# Patient Record
Sex: MALE | Race: WHITE | NOT HISPANIC OR LATINO
[De-identification: names, ages, dates, MRNs, and addresses within clinical notes are randomized per-mention and may not be internally consistent; named-entity substitution may affect disease eponyms.]

---

## 2018-08-07 ENCOUNTER — APPOINTMENT (OUTPATIENT)
Dept: UROLOGY | Facility: CLINIC | Age: 68
End: 2018-08-07
Payer: MEDICARE

## 2018-08-07 VITALS
SYSTOLIC BLOOD PRESSURE: 134 MMHG | HEART RATE: 63 BPM | RESPIRATION RATE: 16 BRPM | DIASTOLIC BLOOD PRESSURE: 72 MMHG | TEMPERATURE: 98.3 F

## 2018-08-07 PROCEDURE — 99204 OFFICE O/P NEW MOD 45 MIN: CPT

## 2018-08-07 RX ORDER — MULTIVITAMIN
TABLET ORAL
Refills: 0 | Status: ACTIVE | COMMUNITY
Start: 2018-08-07

## 2018-08-07 RX ORDER — OMEGA-3/DHA/EPA/FISH OIL 1200 MG
1200 CAPSULE ORAL
Refills: 0 | Status: ACTIVE | COMMUNITY
Start: 2018-08-07

## 2018-08-13 LAB
APPEARANCE: CLEAR
BACTERIA UR CULT: NORMAL
BACTERIA: NEGATIVE
BILIRUBIN URINE: NEGATIVE
BLOOD URINE: NEGATIVE
COLOR: YELLOW
CORE LAB FLUID CYTOLOGY: NORMAL
GLUCOSE QUALITATIVE U: NEGATIVE MG/DL
HYALINE CASTS: 0 /LPF
KETONES URINE: NEGATIVE
LEUKOCYTE ESTERASE URINE: NEGATIVE
MICROSCOPIC-UA: NORMAL
NITRITE URINE: NEGATIVE
PH URINE: 6.5
PROTEIN URINE: NEGATIVE MG/DL
RED BLOOD CELLS URINE: 1 /HPF
SPECIFIC GRAVITY URINE: 1.02
SQUAMOUS EPITHELIAL CELLS: 0 /HPF
UROBILINOGEN URINE: NEGATIVE MG/DL
WHITE BLOOD CELLS URINE: 1 /HPF

## 2018-08-28 ENCOUNTER — APPOINTMENT (OUTPATIENT)
Dept: UROLOGY | Facility: CLINIC | Age: 68
End: 2018-08-28
Payer: MEDICARE

## 2018-08-28 DIAGNOSIS — Z12.5 ENCOUNTER FOR SCREENING FOR MALIGNANT NEOPLASM OF PROSTATE: ICD-10-CM

## 2018-08-28 PROCEDURE — 99214 OFFICE O/P EST MOD 30 MIN: CPT

## 2018-09-24 ENCOUNTER — LABORATORY RESULT (OUTPATIENT)
Age: 68
End: 2018-09-24

## 2018-09-24 ENCOUNTER — APPOINTMENT (OUTPATIENT)
Dept: UROLOGY | Facility: CLINIC | Age: 68
End: 2018-09-24
Payer: MEDICARE

## 2018-09-24 LAB
CORTIS SERPL-MCNC: 6.7 UG/DL
T3RU NFR SERPL: 1.14 INDEX
T4 SERPL-MCNC: 5.8 UG/DL
TSH SERPL-ACNC: 3.35 UIU/ML

## 2018-09-24 PROCEDURE — 99214 OFFICE O/P EST MOD 30 MIN: CPT

## 2018-09-25 LAB
ACTH SER-ACNC: 44 PG/ML
ALDOSTERONE SERUM: 7.8 NG/DL

## 2018-10-01 LAB
TESTOST BND SERPL-MCNC: 10 PG/ML
TESTOST SERPL-MCNC: 600.9 NG/DL

## 2018-10-16 ENCOUNTER — APPOINTMENT (OUTPATIENT)
Dept: UROLOGY | Facility: CLINIC | Age: 68
End: 2018-10-16
Payer: MEDICARE

## 2018-10-16 DIAGNOSIS — R31.0 GROSS HEMATURIA: ICD-10-CM

## 2018-10-16 LAB
APPEARANCE: CLEAR
BACTERIA: NEGATIVE
BILIRUBIN URINE: NEGATIVE
BLOOD URINE: NEGATIVE
COLOR: YELLOW
GLUCOSE QUALITATIVE U: NEGATIVE MG/DL
KETONES URINE: NEGATIVE
LEUKOCYTE ESTERASE URINE: NEGATIVE
MICROSCOPIC-UA: NORMAL
NITRITE URINE: NEGATIVE
PH URINE: 6.5
PROTEIN URINE: NEGATIVE MG/DL
RED BLOOD CELLS URINE: 1 /HPF
SPECIFIC GRAVITY URINE: 1.01
SQUAMOUS EPITHELIAL CELLS: 0 /HPF
UROBILINOGEN URINE: NEGATIVE MG/DL
WHITE BLOOD CELLS URINE: 1 /HPF

## 2018-10-16 PROCEDURE — 99214 OFFICE O/P EST MOD 30 MIN: CPT

## 2018-10-16 RX ORDER — ALFUZOSIN HYDROCHLORIDE 10 MG/1
10 TABLET, EXTENDED RELEASE ORAL
Qty: 30 | Refills: 11 | Status: COMPLETED | COMMUNITY
Start: 2018-08-28 | End: 2018-10-16

## 2018-10-20 LAB
BACTERIA UR CULT: NORMAL
CORE LAB FLUID CYTOLOGY: NORMAL

## 2018-11-30 ENCOUNTER — MEDICATION RENEWAL (OUTPATIENT)
Age: 68
End: 2018-11-30

## 2019-06-18 ENCOUNTER — APPOINTMENT (OUTPATIENT)
Dept: UROLOGY | Facility: CLINIC | Age: 69
End: 2019-06-18
Payer: MEDICARE

## 2019-06-18 DIAGNOSIS — Z00.00 ENCOUNTER FOR GENERAL ADULT MEDICAL EXAMINATION W/OUT ABNORMAL FINDINGS: ICD-10-CM

## 2019-06-18 PROCEDURE — 99214 OFFICE O/P EST MOD 30 MIN: CPT

## 2019-06-18 NOTE — REVIEW OF SYSTEMS
[Eyesight Problems] : eyesight problems [Loss Of Hearing] : hearing loss [Nocturia] : nocturia [Arthralgias] : arthralgias [Joint Pain] : joint pain [Sleep Disturbances] : sleep disturbances [Anxiety] : anxiety [Erectile Dysfunction] : erectile dysfunction [Easy Bleeding] : a tendency for easy bleeding [Easy Bruising] : a tendency for easy bruising [Feeling Poorly] : not feeling poorly [Feeling Tired] : not feeling tired [Nosebleeds] : no nosebleeds [Cough] : no cough [Chest Pain] : no chest pain [Dysuria] : no dysuria [Incontinence] : no incontinence [Constipation] : no constipation [Hesitancy] : no urinary hesitancy [Skin Lesions] : no skin lesions [Testicular Pain] : no testicular pain [Convulsions] : no convulsions [Skin Wound] : no skin wound

## 2019-06-18 NOTE — ADDENDUM
[FreeTextEntry1] : This note was authored by Cristian Eddy working as scribe for Dr. Luis Lopez. I, Dr. Luis Lopez, have reviewed the content of this note and confirm it is true and accurate. I personally performed the history and physical examination and made all the decisions.\par 6/18/19

## 2019-06-18 NOTE — PHYSICAL EXAM
[General Appearance - Well Developed] : well developed [General Appearance - Well Nourished] : well nourished [Normal Appearance] : normal appearance [Well Groomed] : well groomed [General Appearance - In No Acute Distress] : no acute distress [Abdomen Soft] : soft [Abdomen Tenderness] : non-tender [Costovertebral Angle Tenderness] : no ~M costovertebral angle tenderness [Skin Color & Pigmentation] : normal skin color and pigmentation [Heart Rate And Rhythm] : Heart rate and rhythm were normal [Edema] : no peripheral edema [] : no respiratory distress [Respiration, Rhythm And Depth] : normal respiratory rhythm and effort [Exaggerated Use Of Accessory Muscles For Inspiration] : no accessory muscle use [Oriented To Time, Place, And Person] : oriented to person, place, and time [Affect] : the affect was normal [Mood] : the mood was normal [Not Anxious] : not anxious [Normal Station and Gait] : the gait and station were normal for the patient's age [No Focal Deficits] : no focal deficits [No Palpable Adenopathy] : no palpable adenopathy [Cervical Lymph Nodes Enlarged Posterior Bilaterally] : posterior cervical [Cervical Lymph Nodes Enlarged Anterior Bilaterally] : anterior cervical [Supraclavicular Lymph Nodes Enlarged Bilaterally] : supraclavicular [FreeTextEntry1] : Smile Symmetric. Tongue is Midline. hand  is 5/5 bilaterally \par  hand  5/5 bilaterally\par

## 2019-06-18 NOTE — ASSESSMENT
[FreeTextEntry1] : Mr. Denis is  a 69 year old male presented with urinary urgency, erectile dysfunction, and history of kidney stones. Patient was seen by me 4 years ago. Noted he had acute flank pain that resolved on its own a few weeks ago. Complained that when he drinks fluids he will have urinary urgency but denied urge incontinence. Patient denies dysuria, gross hematuria, or incontinence.  I advised the patient to increase his water intake for his kidney stones. Complained of erectile dysfunction, erections are not adequate for penetration. Retired. Former smoker, 2 packs daily. \par 8/28/18: The patient returned and reported his urination has improved since taking tamsulosin. Noted he has had been having abdominal pain since starting tamsulosin. We will switch over to Alfuzosin. Denied any nocturia. PSA from 8/7/18 was 2.39 ng/mL.\par 9/24/18: The patient returned and reported Alfuzosin did not improve his urination. Noted he wanted to try tamsulosin again. Discontinued use before due to upset stomach but noted upset stomach may have been from changing his diet and not the tamsulosin. Complained of decreased sexual desire. Testosterone from 8/7/18 was 362.4 ng/dL. 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 We will check hormone levels today. \par 10/16/18: The patient returned today and reported urination has improved since taking Tamsulosin and denies any side effects. Testosterone, adrenal hormones, THS is normal. The patient denies urinary urgency. Wakes up 0-1 time during the night to urinate. \par \par 6/18/19: Patient presents today for a follow up. Today he reports that his urination is excellent. Tamsulosin 0.4 mg is taken as directed. He is able to take 2 hour drives without having to worry. He was also able to consume a few glasses of beer at a ball game without having to urinate. He is not able to achieve erections and is considering beginning medication at this time.  \par \par Digital rectal exam found no suspicious rectal masses. Anal tone is normal. The prostate is non tender with normal texture, discrete borders and no nodules. It is a 45 gram transurethral resection size. \par \par The patient produced a urine sample which will be sent for urinalysis, urine cytology, and urine culture.\par Blood work today included comprehensive metabolic panel, CBC, PSA and testosterone. \par \par Sildenafil 100 mg is to be initiated at this time. \par \par The patient will return in 6 months.

## 2019-06-24 LAB
ALBUMIN SERPL ELPH-MCNC: 5.1 G/DL
ALP BLD-CCNC: 59 U/L
ALT SERPL-CCNC: 36 U/L
ANION GAP SERPL CALC-SCNC: 12 MMOL/L
APPEARANCE: CLEAR
AST SERPL-CCNC: 31 U/L
BACTERIA UR CULT: NORMAL
BACTERIA: NEGATIVE
BASOPHILS # BLD AUTO: 0.02 K/UL
BASOPHILS NFR BLD AUTO: 0.3 %
BILIRUB SERPL-MCNC: 0.6 MG/DL
BILIRUBIN URINE: NEGATIVE
BLOOD URINE: NEGATIVE
BUN SERPL-MCNC: 25 MG/DL
CALCIUM SERPL-MCNC: 9.9 MG/DL
CHLORIDE SERPL-SCNC: 103 MMOL/L
CO2 SERPL-SCNC: 26 MMOL/L
COLOR: YELLOW
CREAT SERPL-MCNC: 0.87 MG/DL
EOSINOPHIL # BLD AUTO: 0.08 K/UL
EOSINOPHIL NFR BLD AUTO: 1.1 %
GLUCOSE QUALITATIVE U: NORMAL
GLUCOSE SERPL-MCNC: 96 MG/DL
HCT VFR BLD CALC: 46.6 %
HGB BLD-MCNC: 15.1 G/DL
HYALINE CASTS: 0 /LPF
IMM GRANULOCYTES NFR BLD AUTO: 0.4 %
KETONES URINE: NEGATIVE
LEUKOCYTE ESTERASE URINE: NEGATIVE
LYMPHOCYTES # BLD AUTO: 1.55 K/UL
LYMPHOCYTES NFR BLD AUTO: 21.7 %
MAN DIFF?: NORMAL
MCHC RBC-ENTMCNC: 30.6 PG
MCHC RBC-ENTMCNC: 32.4 GM/DL
MCV RBC AUTO: 94.5 FL
MICROSCOPIC-UA: NORMAL
MONOCYTES # BLD AUTO: 0.66 K/UL
MONOCYTES NFR BLD AUTO: 9.2 %
NEUTROPHILS # BLD AUTO: 4.81 K/UL
NEUTROPHILS NFR BLD AUTO: 67.3 %
NITRITE URINE: NEGATIVE
PH URINE: 6
PLATELET # BLD AUTO: 242 K/UL
POTASSIUM SERPL-SCNC: 4.4 MMOL/L
PROT SERPL-MCNC: 7.6 G/DL
PROTEIN URINE: NORMAL
PSA FREE FLD-MCNC: 28 %
PSA FREE SERPL-MCNC: 0.71 NG/ML
PSA SERPL-MCNC: 2.59 NG/ML
RBC # BLD: 4.93 M/UL
RBC # FLD: 13.2 %
RED BLOOD CELLS URINE: 1 /HPF
SODIUM SERPL-SCNC: 141 MMOL/L
SPECIFIC GRAVITY URINE: 1.03
SQUAMOUS EPITHELIAL CELLS: 0 /HPF
TESTOST SERPL-MCNC: 385 NG/DL
URINE CYTOLOGY: NORMAL
UROBILINOGEN URINE: NORMAL
WBC # FLD AUTO: 7.15 K/UL
WHITE BLOOD CELLS URINE: 0 /HPF

## 2020-01-09 ENCOUNTER — APPOINTMENT (OUTPATIENT)
Dept: UROLOGY | Facility: CLINIC | Age: 70
End: 2020-01-09
Payer: MEDICARE

## 2020-01-09 PROCEDURE — 99214 OFFICE O/P EST MOD 30 MIN: CPT

## 2020-01-09 NOTE — ASSESSMENT
[FreeTextEntry1] : Mr. Denis is  a 69 year old male presented with urinary urgency, erectile dysfunction, and history of kidney stones. Patient was seen by me 4 years ago. Noted he had acute flank pain that resolved on its own a few weeks ago. Complained that when he drinks fluids he will have urinary urgency but denied urge incontinence. Patient denies dysuria, gross hematuria, or incontinence.  I advised the patient to increase his water intake for his kidney stones. Complained of erectile dysfunction, erections are not adequate for penetration. Retired. Former smoker, 2 packs daily. \par 8/28/18: The patient returned and reported his urination has improved since taking tamsulosin. Noted he has had been having abdominal pain since starting tamsulosin. We will switch over to Alfuzosin. Denied any nocturia. PSA from 8/7/18 was 2.39 ng/mL.\par 9/24/18: The patient returned and reported Alfuzosin did not improve his urination. Noted he wanted to try tamsulosin again. Discontinued use before due to upset stomach but noted upset stomach may have been from changing his diet and not the tamsulosin. Complained of decreased sexual desire. Testosterone from 8/7/18 was 362.4 ng/dL. 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 We will check hormone levels today. \par 10/16/18: The patient returned today and reported urination has improved since taking Tamsulosin and denies any side effects. Testosterone, adrenal hormones, THS is normal. The patient denies urinary urgency. Wakes up 0-1 time during the night to urinate. \par \par 6/18/19: Patient presents today for a follow up. Today he reports that his urination is excellent. Tamsulosin 0.4 mg is taken as directed. He is able to take 2 hour drives without having to worry. He was also able to consume a few glasses of beer at a ball game without having to urinate. He is not able to achieve erections and is considering beginning medication at this time.  \par \par 01/09/2020: Patient presents today for a follow up of BPH.  Patient reports he notices his urination is great on flomax. He has good control over his urination. He reports his urgency can be improved. Denies dysuria, hematuria, frequency, incontinence, flank pain, fever, or chills.\par PSA 2.59 from 6/18/2019\par \par BPH: pt will start 2 capsule flomax daily for LUTS. Advised to cut down on caffeinated drinks. \par \par The patient will return in 6 months for PSA and prostate exam.

## 2020-01-09 NOTE — REVIEW OF SYSTEMS
[Loss Of Hearing] : hearing loss [Eyesight Problems] : eyesight problems [Nocturia] : nocturia [Joint Pain] : joint pain [Arthralgias] : arthralgias [Anxiety] : anxiety [Sleep Disturbances] : sleep disturbances [Erectile Dysfunction] : erectile dysfunction [Negative] : Neurological [Feeling Tired] : not feeling tired [Feeling Poorly] : not feeling poorly [Nosebleeds] : no nosebleeds [Chest Pain] : no chest pain [Cough] : no cough [Dysuria] : no dysuria [Constipation] : no constipation [Incontinence] : no incontinence [Testicular Pain] : no testicular pain [Hesitancy] : no urinary hesitancy [Skin Lesions] : no skin lesions [Skin Wound] : no skin wound [Convulsions] : no convulsions

## 2020-01-09 NOTE — ADDENDUM
[FreeTextEntry1] : This note was authored by Joselyn Fuentes, working as scribe for Dr. Luis Lopez.\par \par I, Dr. Luis Lopez, have have reviewed the content of this note and confirm it is true and accurate. I personally performed the history and physical examination and made all the decisions. \par \par 01/09/2020

## 2020-01-09 NOTE — PHYSICAL EXAM
[General Appearance - Well Developed] : well developed [Normal Appearance] : normal appearance [General Appearance - Well Nourished] : well nourished [General Appearance - In No Acute Distress] : no acute distress [Well Groomed] : well groomed [Costovertebral Angle Tenderness] : no ~M costovertebral angle tenderness [Abdomen Soft] : soft [Abdomen Tenderness] : non-tender [Skin Color & Pigmentation] : normal skin color and pigmentation [Heart Rate And Rhythm] : Heart rate and rhythm were normal [Edema] : no peripheral edema [] : no respiratory distress [Respiration, Rhythm And Depth] : normal respiratory rhythm and effort [Exaggerated Use Of Accessory Muscles For Inspiration] : no accessory muscle use [Oriented To Time, Place, And Person] : oriented to person, place, and time [Affect] : the affect was normal [Mood] : the mood was normal [Not Anxious] : not anxious [Normal Station and Gait] : the gait and station were normal for the patient's age [No Focal Deficits] : no focal deficits [No Palpable Adenopathy] : no palpable adenopathy [Cervical Lymph Nodes Enlarged Posterior Bilaterally] : posterior cervical [Cervical Lymph Nodes Enlarged Anterior Bilaterally] : anterior cervical [Supraclavicular Lymph Nodes Enlarged Bilaterally] : supraclavicular [FreeTextEntry1] : no spine tenderness

## 2020-01-09 NOTE — HISTORY OF PRESENT ILLNESS
[FreeTextEntry1] : Mr. Denis is  a 69 year old male presented with urinary urgency, erectile dysfunction, and history of kidney stones. Patient was seen by me 4 years ago. Noted he had acute flank pain that resolved on its own a few weeks ago. Complained that when he drinks fluids he will have urinary urgency but denied urge incontinence. Patient denies dysuria, gross hematuria, or incontinence.  I advised the patient to increase his water intake for his kidney stones. Complained of erectile dysfunction, erections are not adequate for penetration. Retired. Former smoker, 2 packs daily. \par 8/28/18: The patient returned and reported his urination has improved since taking tamsulosin. Noted he has had been having abdominal pain since starting tamsulosin. We will switch over to Alfuzosin. Denied any nocturia. PSA from 8/7/18 was 2.39 ng/mL.\par 9/24/18: The patient returned and reported Alfuzosin did not improve his urination. Noted he wanted to try tamsulosin again. Discontinued use before due to upset stomach but noted upset stomach may have been from changing his diet and not the tamsulosin. Complained of decreased sexual desire. Testosterone from 8/7/18 was 362.4 ng/dL. 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 We will check hormone levels today. \par 10/16/18: The patient returned today and reported urination has improved since taking Tamsulosin and denies any side effects. Testosterone, adrenal hormones, THS is normal. The patient denies urinary urgency. Wakes up 0-1 time during the night to urinate. \par 6/18/19: Patient presents today for a follow up. Today he reports that his urination is excellent. Tamsulosin 0.4 mg is taken as directed. He is able to take 2 hour drives without having to worry. He was also able to consume a few glasses of beer at a ball game without having to urinate. He is not able to achieve erections and is considering beginning medication at this time.  \par \par 01/09/2020: Patient presents today for a follow up of BPH.  Patient reports he notices his urination is great on flomax. He has good control over his urination. He reports his urgency can be improved. Denies dysuria, hematuria, frequency, incontinence, flank pain, fever, or chills.\par PSA 2.59 from 6/18/2019

## 2020-01-12 LAB
APPEARANCE: CLEAR
BACTERIA UR CULT: NORMAL
BACTERIA: NEGATIVE
BILIRUBIN URINE: NEGATIVE
BLOOD URINE: NEGATIVE
COLOR: COLORLESS
GLUCOSE QUALITATIVE U: NEGATIVE
HYALINE CASTS: 0 /LPF
KETONES URINE: NEGATIVE
LEUKOCYTE ESTERASE URINE: NEGATIVE
MICROSCOPIC-UA: NORMAL
NITRITE URINE: NEGATIVE
PH URINE: 6
PROTEIN URINE: NEGATIVE
RED BLOOD CELLS URINE: 0 /HPF
SPECIFIC GRAVITY URINE: 1.01
SQUAMOUS EPITHELIAL CELLS: 0 /HPF
UROBILINOGEN URINE: NORMAL
WHITE BLOOD CELLS URINE: 0 /HPF

## 2020-01-15 LAB — URINE CYTOLOGY: NORMAL

## 2020-06-25 ENCOUNTER — APPOINTMENT (OUTPATIENT)
Dept: UROLOGY | Facility: CLINIC | Age: 70
End: 2020-06-25

## 2021-10-28 ENCOUNTER — APPOINTMENT (OUTPATIENT)
Dept: UROLOGY | Facility: CLINIC | Age: 71
End: 2021-10-28
Payer: MEDICARE

## 2021-10-28 PROCEDURE — 99214 OFFICE O/P EST MOD 30 MIN: CPT | Mod: 25

## 2021-10-28 NOTE — REVIEW OF SYSTEMS
[Eyesight Problems] : eyesight problems [Loss Of Hearing] : hearing loss [Nocturia] : nocturia [Arthralgias] : arthralgias [Joint Pain] : joint pain [Sleep Disturbances] : sleep disturbances [Anxiety] : anxiety [Erectile Dysfunction] : erectile dysfunction [Negative] : Neurological [Feeling Poorly] : not feeling poorly [Feeling Tired] : not feeling tired [Nosebleeds] : no nosebleeds [Chest Pain] : no chest pain [Cough] : no cough [Constipation] : no constipation [Dysuria] : no dysuria [Incontinence] : no incontinence [Hesitancy] : no urinary hesitancy [Testicular Pain] : no testicular pain [Skin Lesions] : no skin lesions [Skin Wound] : no skin wound [Convulsions] : no convulsions

## 2021-10-28 NOTE — ASSESSMENT
[FreeTextEntry1] : Mr. Denis is  a 69 year old male presented with urinary urgency, erectile dysfunction, and history of kidney stones. Patient was seen by me 4 years ago. Noted he had acute flank pain that resolved on its own a few weeks ago. Complained that when he drinks fluids he will have urinary urgency but denied urge incontinence. Patient denies dysuria, gross hematuria, or incontinence.  I advised the patient to increase his water intake for his kidney stones. Complained of erectile dysfunction, erections are not adequate for penetration. Retired. Former smoker, 2 packs daily. \par 8/28/18: The patient returned and reported his urination has improved since taking tamsulosin. Noted he has had been having abdominal pain since starting tamsulosin. We will switch over to Alfuzosin. Denied any nocturia. PSA from 8/7/18 was 2.39 ng/mL.\par 9/24/18: The patient returned and reported Alfuzosin did not improve his urination. Noted he wanted to try tamsulosin again. Discontinued use before due to upset stomach but noted upset stomach may have been from changing his diet and not the tamsulosin. Complained of decreased sexual desire. Testosterone from 8/7/18 was 362.4 ng/dL. 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 We will check hormone levels today. \par 10/16/18: The patient returned today and reported urination has improved since taking Tamsulosin and denies any side effects. Testosterone, adrenal hormones, THS is normal. The patient denies urinary urgency. Wakes up 0-1 time during the night to urinate. \par \par 6/18/19: Patient presents today for a follow up. Today he reports that his urination is excellent. Tamsulosin 0.4 mg is taken as directed. He is able to take 2 hour drives without having to worry. He was also able to consume a few glasses of beer at a ball game without having to urinate. He is not able to achieve erections and is considering beginning medication at this time.  \par \par 01/09/2020: Patient presents today for a follow up of BPH.  Patient reports he notices his urination is great on flomax. He has good control over his urination. He reports his urgency can be improved. Denies dysuria, hematuria, frequency, incontinence, flank pain, fever, or chills.\par PSA 2.59 from 6/18/2019\par \par BPH: pt will start 2 capsule flomax daily for LUTS. Advised to cut down on caffeinated drinks. \par The patient will return in 6 months for PSA and prostate exam.\par \par 10/28/2021: Patient presents today for his annual urological evaluation. Urination is pretty good on tamsulosin 0.4 mg BID after a meal. He reports that he sometimes forgets to take one dose and will take one in the afternoon if he forgets the morning and then again after dinner. Denies any urge incontinence but sometimes comes close to it. Erections are not adequate but reports he hasn't had many opportunities due to his wife getting sick and needing surgery. He feels there might be a possibility for sex in the future and would like to try a medication to improve his erections. Last PSA of 6/2019 was 2.59.\par \par The knee-chest position was utilized for the NIRMAL.Digital rectal exam found no suspicious rectal masses. Anal tone is normal. The prostate is non tender, with normal texture, discrete borders, and no nodules. It is a 65 gram transurethral resection size. No gross blood on the examining finger. \par  \par Patient provided a urine specimen that will be sent for urinalysis, urine culture and urine cytology.\par  \par Blood work today included CMP, CBC with diff, alkaline phosphatase, PSA, testosterone, androstenedione, dehydroepiandrosterone sulfate serum, dehydroepiandrosterone serum, dihydrotestosterone, estradiol, estrogen total, FSH, LH, TSH, Magnesium, serum osmolality, phosphorus, progesterone, uric acid serum, and prolactin. \par \par Renewed tamsulosin 0.4 mg BID , a half an hour after a meal. \par \par I prescribed the patient tadalafil 5 mg once daily for ED as well as further improvement in his urination. Possible ill effects were reviewed, pt understood. I informed the patient that if he finds this is not enough improvement in his erections, he can take an additional 5 mg on the days he plans to have sex. \par \par Patient will RTO in one month to discuss the efficacy of tadalafil on his urination and erections. \par \par Preparation, in person office visit, and coordination of care: 30 minutes.

## 2021-10-28 NOTE — HISTORY OF PRESENT ILLNESS
[FreeTextEntry1] : Mr. Denis is  a 71 year old male presented with urinary urgency, erectile dysfunction, and history of kidney stones. Patient was seen by me 4 years ago. Noted he had acute flank pain that resolved on its own a few weeks ago. Complained that when he drinks fluids he will have urinary urgency but denied urge incontinence. Patient denies dysuria, gross hematuria, or incontinence.  I advised the patient to increase his water intake for his kidney stones. Complained of erectile dysfunction, erections are not adequate for penetration. Retired. Former smoker, 2 packs daily. \par 8/28/18: The patient returned and reported his urination has improved since taking tamsulosin. Noted he has had been having abdominal pain since starting tamsulosin. We will switch over to Alfuzosin. Denied any nocturia. PSA from 8/7/18 was 2.39 ng/mL.\par 9/24/18: The patient returned and reported Alfuzosin did not improve his urination. Noted he wanted to try tamsulosin again. Discontinued use before due to upset stomach but noted upset stomach may have been from changing his diet and not the tamsulosin. Complained of decreased sexual desire. Testosterone from 8/7/18 was 362.4 ng/dL. 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 We will check hormone levels today. \par 10/16/18: The patient returned today and reported urination has improved since taking Tamsulosin and denies any side effects. Testosterone, adrenal hormones, THS is normal. The patient denies urinary urgency. Wakes up 0-1 time during the night to urinate. \par 6/18/19: Patient presents today for a follow up. Today he reports that his urination is excellent. Tamsulosin 0.4 mg is taken as directed. He is able to take 2 hour drives without having to worry. He was also able to consume a few glasses of beer at a ball game without having to urinate. He is not able to achieve erections and is considering beginning medication at this time.  \par \par 01/09/2020: Patient presents today for a follow up of BPH.  Patient reports he notices his urination is great on flomax. He has good control over his urination. He reports his urgency can be improved. Denies dysuria, hematuria, frequency, incontinence, flank pain, fever, or chills.\par PSA 2.59 from 6/18/2019\par \par 10/28/2021: Patient presents today for his annual urological evaluation. Urination is pretty good on tamsulosin 0.4 mg BID after a meal. He reports that he sometimes forgets to take one dose and will take one in the afternoon if he forgets the morning and then again after dinner. Denies any urge incontinence but sometimes comes close to it. Erections are not adequate but reports he hasn't had many opportunities due to his wife getting sick and needing surgery. He feels there might be a possibility for sex in the future and would like to try a medication to improve his erections.  Last PSA of 6/2019 was 2.59.

## 2021-10-28 NOTE — PHYSICAL EXAM
[General Appearance - Well Developed] : well developed [General Appearance - Well Nourished] : well nourished [Normal Appearance] : normal appearance [Well Groomed] : well groomed [General Appearance - In No Acute Distress] : no acute distress [Abdomen Soft] : soft [Abdomen Tenderness] : non-tender [Costovertebral Angle Tenderness] : no ~M costovertebral angle tenderness [Edema] : no peripheral edema [] : no respiratory distress [Exaggerated Use Of Accessory Muscles For Inspiration] : no accessory muscle use [Respiration, Rhythm And Depth] : normal respiratory rhythm and effort [Oriented To Time, Place, And Person] : oriented to person, place, and time [Affect] : the affect was normal [Mood] : the mood was normal [Not Anxious] : not anxious [Normal Station and Gait] : the gait and station were normal for the patient's age [No Focal Deficits] : no focal deficits [FreeTextEntry1] : The knee-chest position was utilized for the NIRMAL.Digital rectal exam found no suspicious rectal masses. Anal tone is normal. The prostate is non tender, with normal texture, discrete borders, and no nodules. It is a 65 gram transurethral resection size. No gross blood on the examining finger.

## 2021-10-30 LAB
ALBUMIN SERPL ELPH-MCNC: 5.1 G/DL
ALP BLD-CCNC: 82 U/L
ALT SERPL-CCNC: 20 U/L
ANION GAP SERPL CALC-SCNC: 17 MMOL/L
APPEARANCE: CLEAR
AST SERPL-CCNC: 22 U/L
BACTERIA UR CULT: NORMAL
BACTERIA: NEGATIVE
BASOPHILS # BLD AUTO: 0.02 K/UL
BASOPHILS NFR BLD AUTO: 0.3 %
BILIRUB SERPL-MCNC: 0.6 MG/DL
BILIRUBIN URINE: NEGATIVE
BLOOD URINE: NEGATIVE
BUN SERPL-MCNC: 16 MG/DL
CALCIUM SERPL-MCNC: 10 MG/DL
CHLORIDE SERPL-SCNC: 102 MMOL/L
CO2 SERPL-SCNC: 23 MMOL/L
COLOR: NORMAL
CREAT SERPL-MCNC: 0.79 MG/DL
DHEA-S SERPL-MCNC: 233 UG/DL
EOSINOPHIL # BLD AUTO: 0.07 K/UL
EOSINOPHIL NFR BLD AUTO: 1.2 %
ESTRADIOL SERPL-MCNC: 18 PG/ML
FSH SERPL-MCNC: 4.4 IU/L
GLUCOSE QUALITATIVE U: NEGATIVE
GLUCOSE SERPL-MCNC: 86 MG/DL
HCT VFR BLD CALC: 47.5 %
HGB BLD-MCNC: 15.6 G/DL
HYALINE CASTS: 0 /LPF
IMM GRANULOCYTES NFR BLD AUTO: 0.3 %
KETONES URINE: NEGATIVE
LEUKOCYTE ESTERASE URINE: NEGATIVE
LH SERPL-ACNC: 3.7 IU/L
LYMPHOCYTES # BLD AUTO: 1.34 K/UL
LYMPHOCYTES NFR BLD AUTO: 23.1 %
MAGNESIUM SERPL-MCNC: 2.3 MG/DL
MAN DIFF?: NORMAL
MCHC RBC-ENTMCNC: 31.1 PG
MCHC RBC-ENTMCNC: 32.8 GM/DL
MCV RBC AUTO: 94.6 FL
MICROSCOPIC-UA: NORMAL
MONOCYTES # BLD AUTO: 0.51 K/UL
MONOCYTES NFR BLD AUTO: 8.8 %
NEUTROPHILS # BLD AUTO: 3.85 K/UL
NEUTROPHILS NFR BLD AUTO: 66.3 %
NITRITE URINE: NEGATIVE
OSMOLALITY SERPL: 304 MOSMOL/KG
PH URINE: 6.5
PHOSPHATE SERPL-MCNC: 3.5 MG/DL
PLATELET # BLD AUTO: 257 K/UL
POTASSIUM SERPL-SCNC: 4.9 MMOL/L
PROGEST SERPL-MCNC: 0.1 NG/ML
PROLACTIN SERPL-MCNC: 6.3 NG/ML
PROT SERPL-MCNC: 8.2 G/DL
PROTEIN URINE: NEGATIVE
PSA SERPL-MCNC: 2.96 NG/ML
RBC # BLD: 5.02 M/UL
RBC # FLD: 13.4 %
RED BLOOD CELLS URINE: 0 /HPF
SODIUM SERPL-SCNC: 142 MMOL/L
SPECIFIC GRAVITY URINE: 1.01
SQUAMOUS EPITHELIAL CELLS: 0 /HPF
TESTOST BND SERPL-MCNC: 11.6 PG/ML
TESTOSTERONE TOTAL S: 479 NG/DL
TSH SERPL-ACNC: 5.59 UIU/ML
URATE SERPL-MCNC: 6.5 MG/DL
URINE CYTOLOGY: NORMAL
UROBILINOGEN URINE: NORMAL
WBC # FLD AUTO: 5.81 K/UL
WHITE BLOOD CELLS URINE: 0 /HPF

## 2021-11-01 ENCOUNTER — TRANSCRIPTION ENCOUNTER (OUTPATIENT)
Age: 71
End: 2021-11-01

## 2021-11-01 ENCOUNTER — NON-APPOINTMENT (OUTPATIENT)
Age: 71
End: 2021-11-01

## 2021-11-05 LAB
ANDROST SERPL-MCNC: 59 NG/DL
DHEA SERPL-MCNC: 113 NG/DL
ESTROGEN SERPL-MCNC: 134 PG/ML
SHBG SERPL-SCNC: 56.2 NMOL/L

## 2021-11-07 LAB — ANDROSTANOLONE SERPL-MCNC: 59 NG/DL

## 2021-11-29 ENCOUNTER — APPOINTMENT (OUTPATIENT)
Dept: UROLOGY | Facility: CLINIC | Age: 71
End: 2021-11-29
Payer: MEDICARE

## 2021-11-29 PROCEDURE — 99214 OFFICE O/P EST MOD 30 MIN: CPT

## 2021-12-13 NOTE — ASSESSMENT
[FreeTextEntry1] : Mr. Denis is  a 71 year old male presented with urinary urgency, erectile dysfunction, and history of kidney stones. Patient was seen by me 4 years ago. Noted he had acute flank pain that resolved on its own a few weeks ago. Complained that when he drinks fluids he will have urinary urgency but denied urge incontinence. Patient denies dysuria, gross hematuria, or incontinence.  I advised the patient to increase his water intake for his kidney stones. Complained of erectile dysfunction, erections are not adequate for penetration. Retired. Former smoker, 2 packs daily. \par 8/28/18: The patient returned and reported his urination has improved since taking tamsulosin. Noted he has had been having abdominal pain since starting tamsulosin. We will switch over to Alfuzosin. Denied any nocturia. PSA from 8/7/18 was 2.39 ng/mL.\par 9/24/18: The patient returned and reported Alfuzosin did not improve his urination. Noted he wanted to try tamsulosin again. Discontinued use before due to upset stomach but noted upset stomach may have been from changing his diet and not the tamsulosin. Complained of decreased sexual desire. Testosterone from 8/7/18 was 362.4 ng/dL. 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 We will check hormone levels today. \par 10/16/18: The patient returned today and reported urination has improved since taking Tamsulosin and denies any side effects. Testosterone, adrenal hormones, THS is normal. The patient denies urinary urgency. Wakes up 0-1 time during the night to urinate. \par \par 6/18/19: Patient presents today for a follow up. Today he reports that his urination is excellent. Tamsulosin 0.4 mg is taken as directed. He is able to take 2 hour drives without having to worry. He was also able to consume a few glasses of beer at a ball game without having to urinate. He is not able to achieve erections and is considering beginning medication at this time.  \par \par 01/09/2020: Patient presents today for a follow up of BPH.  Patient reports he notices his urination is great on flomax. He has good control over his urination. He reports his urgency can be improved. Denies dysuria, hematuria, frequency, incontinence, flank pain, fever, or chills.\par PSA 2.59 from 6/18/2019\par \par 10/28/2021: Patient presents today for his annual urological evaluation. Urination is pretty good on tamsulosin 0.4 mg BID after a meal. He reports that he sometimes forgets to take one dose and will take one in the afternoon if he forgets the morning and then again after dinner. Denies any urge incontinence but sometimes comes close to it. Erections are not adequate but reports he hasn't had many opportunities due to his wife getting sick and needing surgery. He feels there might be a possibility for sex in the future and would like to try a medication to improve his erections. Last PSA of 6/2019 was 2.59.\par \par 11/29/2021: Patient presents today for follow up. Last visit, was started on Tadalafil 5mg once daily. Took one for three days, however developed stomach aches, headaches, blurred vision, and palpations by the second day, and stopped. States he took Tamsulosin BID once in morning and once at night however still had some urgency. However, switched to two pills at night after dinner which improved his urgency. Stlil having poor erections. \par \par Reviewed 10/28/21 lab work with patient in detail. \par \par D/c Tadalafil as patient developed side effects. \par \par Continue Tamsulosin 0.4mg two pills after dinner. \par \par Will be seeing PCP to discuss elevated TSH in January. \par \par For ED, I have discussed injections and implanted devices as alternatives to Sildenafil or Tadalafil, however patient declines.\par \par RTO in 6 months for follow up or sooner if new urinary symptoms develop. \par \par Preparation, in-person office visit, and coordination of care took:30 minutes

## 2021-12-13 NOTE — ADDENDUM
[FreeTextEntry1] : I, Dafne Mackenziein, acted solely as a scribe for Dr. Luis Lopez on this date 11/29/2021.\par \par All medical record entries made by the Scribe were at my, Dr. Luis Lopez, direction and personally dictated by me on 11/29/2021. I have reviewed the chart and agree that the record accurately reflects my personal performance of the history, physical exam, assessment and plan.  I have also personally directed, reviewed and agreed with the chart.

## 2021-12-13 NOTE — HISTORY OF PRESENT ILLNESS
[FreeTextEntry1] : Mr. Denis is  a 71 year old male presented with urinary urgency, erectile dysfunction, and history of kidney stones. Patient was seen by me 4 years ago. Noted he had acute flank pain that resolved on its own a few weeks ago. Complained that when he drinks fluids he will have urinary urgency but denied urge incontinence. Patient denies dysuria, gross hematuria, or incontinence.  I advised the patient to increase his water intake for his kidney stones. Complained of erectile dysfunction, erections are not adequate for penetration. Retired. Former smoker, 2 packs daily. \par 8/28/18: The patient returned and reported his urination has improved since taking tamsulosin. Noted he has had been having abdominal pain since starting tamsulosin. We will switch over to Alfuzosin. Denied any nocturia. PSA from 8/7/18 was 2.39 ng/mL.\par 9/24/18: The patient returned and reported Alfuzosin did not improve his urination. Noted he wanted to try tamsulosin again. Discontinued use before due to upset stomach but noted upset stomach may have been from changing his diet and not the tamsulosin. Complained of decreased sexual desire. Testosterone from 8/7/18 was 362.4 ng/dL. 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 We will check hormone levels today. \par 10/16/18: The patient returned today and reported urination has improved since taking Tamsulosin and denies any side effects. Testosterone, adrenal hormones, THS is normal. The patient denies urinary urgency. Wakes up 0-1 time during the night to urinate. \par 6/18/19: Patient presents today for a follow up. Today he reports that his urination is excellent. Tamsulosin 0.4 mg is taken as directed. He is able to take 2 hour drives without having to worry. He was also able to consume a few glasses of beer at a ball game without having to urinate. He is not able to achieve erections and is considering beginning medication at this time.  \par \par 01/09/2020: Patient presents today for a follow up of BPH.  Patient reports he notices his urination is great on flomax. He has good control over his urination. He reports his urgency can be improved. Denies dysuria, hematuria, frequency, incontinence, flank pain, fever, or chills.\par PSA 2.59 from 6/18/2019\par \par 10/28/2021: Patient presents today for his annual urological evaluation. Urination is pretty good on tamsulosin 0.4 mg BID after a meal. He reports that he sometimes forgets to take one dose and will take one in the afternoon if he forgets the morning and then again after dinner. Denies any urge incontinence but sometimes comes close to it. Erections are not adequate but reports he hasn't had many opportunities due to his wife getting sick and needing surgery. He feels there might be a possibility for sex in the future and would like to try a medication to improve his erections.  Last PSA of 6/2019 was 2.59.\par \par 11/29/2021: Patient presents today for follow up. Last visit, was started on Tadalafil 5mg once daily. Took one for three days, however developed stomach aches, headaches, blurred vision, and palpations by the second day, and stopped. States he took Tamsulosin BID once in morning and once at night however still had some urgency. However, switched to two pills at night after dinner which improved his urgency. Stlil having poor erections.

## 2022-05-31 ENCOUNTER — LABORATORY RESULT (OUTPATIENT)
Age: 72
End: 2022-05-31

## 2022-05-31 ENCOUNTER — APPOINTMENT (OUTPATIENT)
Dept: UROLOGY | Facility: CLINIC | Age: 72
End: 2022-05-31
Payer: MEDICARE

## 2022-05-31 PROCEDURE — 99214 OFFICE O/P EST MOD 30 MIN: CPT

## 2022-06-04 LAB
ALP BLD-CCNC: 70 U/L
ANION GAP SERPL CALC-SCNC: 12 MMOL/L
APPEARANCE: CLEAR
BACTERIA UR CULT: NORMAL
BACTERIA: NEGATIVE
BILIRUBIN URINE: NEGATIVE
BLOOD URINE: NEGATIVE
BUN SERPL-MCNC: 16 MG/DL
CALCIUM SERPL-MCNC: 9.7 MG/DL
CHLORIDE SERPL-SCNC: 104 MMOL/L
CO2 SERPL-SCNC: 25 MMOL/L
COLOR: YELLOW
CREAT SERPL-MCNC: 0.83 MG/DL
EGFR: 92 ML/MIN/1.73M2
ESTRADIOL SERPL-MCNC: 18 PG/ML
ESTROGEN SERPL-MCNC: 94 PG/ML
GLUCOSE QUALITATIVE U: NEGATIVE
GLUCOSE SERPL-MCNC: 91 MG/DL
HYALINE CASTS: 0 /LPF
KETONES URINE: NEGATIVE
LEUKOCYTE ESTERASE URINE: NEGATIVE
MAGNESIUM SERPL-MCNC: 2.3 MG/DL
MICROSCOPIC-UA: NORMAL
NITRITE URINE: NEGATIVE
OSMOLALITY SERPL: 516 MOSMOL/KG
PH URINE: 6.5
PHOSPHATE SERPL-MCNC: 3.2 MG/DL
POTASSIUM SERPL-SCNC: 4.5 MMOL/L
PROTEIN URINE: NEGATIVE
PSA FREE FLD-MCNC: 27 %
PSA FREE SERPL-MCNC: 0.74 NG/ML
PSA SERPL-MCNC: 2.72 NG/ML
RED BLOOD CELLS URINE: 2 /HPF
SODIUM SERPL-SCNC: 141 MMOL/L
SPECIFIC GRAVITY URINE: 1.01
SQUAMOUS EPITHELIAL CELLS: 0 /HPF
T3FREE SERPL-MCNC: 3 PG/ML
T3RU NFR SERPL: 1.1 TBI
T4 FREE SERPL-MCNC: 1 NG/DL
TESTOST FREE SERPL-MCNC: 10 PG/ML
TESTOST SERPL-MCNC: 400 NG/DL
TSH SERPL-ACNC: 4.38 UIU/ML
URATE SERPL-MCNC: 5.7 MG/DL
URINE CYTOLOGY: NORMAL
UROBILINOGEN URINE: NORMAL
WHITE BLOOD CELLS URINE: 0 /HPF

## 2022-06-06 LAB — ANDROSTANOLONE SERPL-MCNC: 53 NG/DL

## 2022-06-15 LAB
ANION GAP SERPL CALC-SCNC: 15 MMOL/L
BUN SERPL-MCNC: 18 MG/DL
CALCIUM SERPL-MCNC: 9.9 MG/DL
CHLORIDE SERPL-SCNC: 102 MMOL/L
CO2 SERPL-SCNC: 25 MMOL/L
CREAT SERPL-MCNC: 0.87 MG/DL
EGFR: 91 ML/MIN/1.73M2
GLUCOSE SERPL-MCNC: 112 MG/DL
OSMOLALITY SERPL: 300 MOSMOL/KG
POTASSIUM SERPL-SCNC: 4.6 MMOL/L
SODIUM SERPL-SCNC: 143 MMOL/L

## 2022-06-15 NOTE — HISTORY OF PRESENT ILLNESS
[FreeTextEntry1] : Mr. Denis is  a 72 year old male presented with urinary urgency, erectile dysfunction, and history of kidney stones. Patient was seen by me 4 years ago. Noted he had acute flank pain that resolved on its own a few weeks ago. Complained that when he drinks fluids he will have urinary urgency but denied urge incontinence. Patient denies dysuria, gross hematuria, or incontinence.  I advised the patient to increase his water intake for his kidney stones. Complained of erectile dysfunction, erections are not adequate for penetration. Retired. Former smoker, 2 packs daily. \par 8/28/18: The patient returned and reported his urination has improved since taking tamsulosin. Noted he has had been having abdominal pain since starting tamsulosin. We will switch over to Alfuzosin. Denied any nocturia. PSA from 8/7/18 was 2.39 ng/mL.\par 9/24/18: The patient returned and reported Alfuzosin did not improve his urination. Noted he wanted to try tamsulosin again. Discontinued use before due to upset stomach but noted upset stomach may have been from changing his diet and not the tamsulosin. Complained of decreased sexual desire. Testosterone from 8/7/18 was 362.4 ng/dL. 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 We will check hormone levels today. \par 10/16/18: The patient returned today and reported urination has improved since taking Tamsulosin and denies any side effects. Testosterone, adrenal hormones, THS is normal. The patient denies urinary urgency. Wakes up 0-1 time during the night to urinate. \par 6/18/19: Patient presents today for a follow up. Today he reports that his urination is excellent. Tamsulosin 0.4 mg is taken as directed. He is able to take 2 hour drives without having to worry. He was also able to consume a few glasses of beer at a ball game without having to urinate. He is not able to achieve erections and is considering beginning medication at this time.  \par \par 01/09/2020: Patient presents today for a follow up of BPH.  Patient reports he notices his urination is great on flomax. He has good control over his urination. He reports his urgency can be improved. Denies dysuria, hematuria, frequency, incontinence, flank pain, fever, or chills.\par PSA 2.59 from 6/18/2019\par \par 10/28/2021: Patient presents today for his annual urological evaluation. Urination is pretty good on tamsulosin 0.4 mg BID after a meal. He reports that he sometimes forgets to take one dose and will take one in the afternoon if he forgets the morning and then again after dinner. Denies any urge incontinence but sometimes comes close to it. Erections are not adequate but reports he hasn't had many opportunities due to his wife getting sick and needing surgery. He feels there might be a possibility for sex in the future and would like to try a medication to improve his erections.  Last PSA of 6/2019 was 2.59.\par \par 11/29/2021: Patient presents today for follow up. Last visit, was started on Tadalafil 5mg once daily. Took one for three days, however developed stomach aches, headaches, blurred vision, and palpations by the second day, and stopped. States he took Tamsulosin BID once in morning and once at night however still had some urgency. However, switched to two pills at night after dinner which improved his urgency. Stlil having poor erections. \par \par 05/31/2022:  presents today for a follow up. He reports his urination is improved and feels he has good bladder control. Takes tamsulosin 0.4 mg BID. Denies urinary urgency. The patient has had trouble with tadalafil in the past. Is not interested in anything else to improve erections at this time. Patient recently had cataract surgery on the left eye, vision is improved. Getting the right eye done in a few weeks. Patient has a hx of kidney stones. Drinks about 3 quarts of liquids a day including coffee, water, gatorade which helps his leg cramps, and seltzer with his meals. Pt has not seen an endocrinologist as suggested in the past for elevated TSH.

## 2022-06-15 NOTE — ADDENDUM
[FreeTextEntry1] : This note was authored by Concepción Delgado working as a scribe for Dr.Gary Lopez. I, Dr. Luis Lopez have reviewed the content of this note and confirm it is true and accurate. I personally performed the history and physical examination and made all the decisions 05/31/2022.

## 2022-06-15 NOTE — ASSESSMENT
[FreeTextEntry1] : Mr. Denis is  a 72 year old male presented with urinary urgency, erectile dysfunction, and history of kidney stones. Patient was seen by me 4 years ago. Noted he had acute flank pain that resolved on its own a few weeks ago. Complained that when he drinks fluids he will have urinary urgency but denied urge incontinence. Patient denies dysuria, gross hematuria, or incontinence.  I advised the patient to increase his water intake for his kidney stones. Complained of erectile dysfunction, erections are not adequate for penetration. Retired. Former smoker, 2 packs daily. \par 8/28/18: The patient returned and reported his urination has improved since taking tamsulosin. Noted he has had been having abdominal pain since starting tamsulosin. We will switch over to Alfuzosin. Denied any nocturia. PSA from 8/7/18 was 2.39 ng/mL.\par 9/24/18: The patient returned and reported Alfuzosin did not improve his urination. Noted he wanted to try tamsulosin again. Discontinued use before due to upset stomach but noted upset stomach may have been from changing his diet and not the tamsulosin. Complained of decreased sexual desire. Testosterone from 8/7/18 was 362.4 ng/dL. 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 We will check hormone levels today. \par 10/16/18: The patient returned today and reported urination has improved since taking Tamsulosin and denies any side effects. Testosterone, adrenal hormones, THS is normal. The patient denies urinary urgency. Wakes up 0-1 time during the night to urinate. \par \par 6/18/19: Patient presents today for a follow up. Today he reports that his urination is excellent. Tamsulosin 0.4 mg is taken as directed. He is able to take 2 hour drives without having to worry. He was also able to consume a few glasses of beer at a ball game without having to urinate. He is not able to achieve erections and is considering beginning medication at this time.  \par \par 01/09/2020: Patient presents today for a follow up of BPH.  Patient reports he notices his urination is great on flomax. He has good control over his urination. He reports his urgency can be improved. Denies dysuria, hematuria, frequency, incontinence, flank pain, fever, or chills.\par PSA 2.59 from 6/18/2019\par \par 10/28/2021: Patient presents today for his annual urological evaluation. Urination is pretty good on tamsulosin 0.4 mg BID after a meal. He reports that he sometimes forgets to take one dose and will take one in the afternoon if he forgets the morning and then again after dinner. Denies any urge incontinence but sometimes comes close to it. Erections are not adequate but reports he hasn't had many opportunities due to his wife getting sick and needing surgery. He feels there might be a possibility for sex in the future and would like to try a medication to improve his erections. Last PSA of 6/2019 was 2.59.\par \par 11/29/2021: Patient presents today for follow up. Last visit, was started on Tadalafil 5mg once daily. Took one for three days, however developed stomach aches, headaches, blurred vision, and palpations by the second day, and stopped. States he took Tamsulosin BID once in morning and once at night however still had some urgency. However, switched to two pills at night after dinner which improved his urgency. Stlil having poor erections. \par \par Reviewed 10/28/21 lab work with patient in detail. \par D/c Tadalafil as patient developed side effects. \par Continue Tamsulosin 0.4mg two pills after dinner. \par Will be seeing PCP to discuss elevated TSH in January. \par For ED, I have discussed injections and implanted devices as alternatives to Sildenafil or Tadalafil, however patient declines.\par RTO in 6 months for follow up or sooner if new urinary symptoms develop. \par \par 05/31/2022:  presents today for a follow up. He reports his urination is improved and feels he has good bladder control. Takes tamsulosin 0.4 mg BID. Denies urinary urgency. The patient has had trouble with tadalafil in the past. Is not interested in anything else to improve erections at this time. Patient recently had cataract surgery on the left eye, vision is improved. Getting the right eye done in a few weeks. Patient has a hx of kidney stones. Drinks about 3 quarts of liquids a day including coffee, water, gatorade which helps his leg cramps, and seltzer with his meals. Pt has not seen an endocrinologist as suggested in the past for elevated TSH. \par \par The knee-chest position was utilized for the NIRMAL. Digital rectal exam found no suspicious rectal masses. Normal seminal vesicles. Anal tone is normal. The prostate is non tender, with normal texture, discrete borders, and no nodules. It is an 45 gram transurethral resection size. No rectal mucosal lesions. No gross blood on the examining finger. \par \par Renewed tamsulosin 0.4 mg BID. \par \par Strongly suggested that he keep up his liquid consumption to reduce the risk of forming kidney stones. Strongly suggested that for every cup of coffee he has two glasses of water. \par \par The patient produced a urine sample which will be sent for urinalysis, urine cytology, and urine culture. \par \par Blood work today included TSH, uric acid, serum osmolality, magnesium, estradiol, dihydrotestosterone, estrogen, alkaline phosphatase, BMP, PSA, and testosterone. Instructed the patient to speak with his PCP about his elevated TSH. Will send results from today to his PCP. TSH of 10/28/2021 was elevated at 5.59 where the upper limits of normal is 4.20. \par \par Patient will RTO in 6 months or sooner if clinically indicated. \par \par Preparation, in person office visit, and coordination of care: 30 minutes.

## 2022-06-15 NOTE — PHYSICAL EXAM
[General Appearance - Well Developed] : well developed [General Appearance - Well Nourished] : well nourished [Normal Appearance] : normal appearance [Well Groomed] : well groomed [General Appearance - In No Acute Distress] : no acute distress [Abdomen Soft] : soft [Abdomen Tenderness] : non-tender [Costovertebral Angle Tenderness] : no ~M costovertebral angle tenderness [Edema] : no peripheral edema [] : no respiratory distress [Respiration, Rhythm And Depth] : normal respiratory rhythm and effort [Exaggerated Use Of Accessory Muscles For Inspiration] : no accessory muscle use [Oriented To Time, Place, And Person] : oriented to person, place, and time [Affect] : the affect was normal [Mood] : the mood was normal [Not Anxious] : not anxious [Normal Station and Gait] : the gait and station were normal for the patient's age [No Focal Deficits] : no focal deficits [FreeTextEntry1] : The knee-chest position was utilized for the NIRMAL. Digital rectal exam found no suspicious rectal masses. Normal seminal vesicles. Anal tone is normal. The prostate is non tender, with normal texture, discrete borders, and no nodules. It is an 45 gram transurethral resection size. No rectal mucosal lesions. No gross blood on the examining finger.

## 2022-12-01 ENCOUNTER — APPOINTMENT (OUTPATIENT)
Dept: UROLOGY | Facility: CLINIC | Age: 72
End: 2022-12-01

## 2023-01-09 LAB
APPEARANCE: CLEAR
BACTERIA UR CULT: NORMAL
BACTERIA: NEGATIVE
BILIRUBIN URINE: NEGATIVE
BLOOD URINE: NEGATIVE
COLOR: YELLOW
GLUCOSE QUALITATIVE U: NEGATIVE
HYALINE CASTS: 1 /LPF
KETONES URINE: NEGATIVE
LEUKOCYTE ESTERASE URINE: NEGATIVE
MICROSCOPIC-UA: NORMAL
NITRITE URINE: NEGATIVE
PH URINE: 6
PROTEIN URINE: NORMAL
RED BLOOD CELLS URINE: 1 /HPF
SPECIFIC GRAVITY URINE: 1.03
SQUAMOUS EPITHELIAL CELLS: 0 /HPF
URINE CYTOLOGY: NORMAL
UROBILINOGEN URINE: ABNORMAL
WHITE BLOOD CELLS URINE: 1 /HPF

## 2023-01-12 ENCOUNTER — APPOINTMENT (OUTPATIENT)
Dept: UROLOGY | Facility: CLINIC | Age: 73
End: 2023-01-12
Payer: MEDICARE

## 2023-01-12 VITALS
OXYGEN SATURATION: 96 % | RESPIRATION RATE: 16 BRPM | HEART RATE: 69 BPM | TEMPERATURE: 98 F | SYSTOLIC BLOOD PRESSURE: 154 MMHG | DIASTOLIC BLOOD PRESSURE: 78 MMHG

## 2023-01-12 PROCEDURE — 99214 OFFICE O/P EST MOD 30 MIN: CPT

## 2023-01-12 RX ORDER — TADALAFIL 5 MG/1
5 TABLET ORAL
Qty: 30 | Refills: 11 | Status: COMPLETED | COMMUNITY
Start: 2021-10-28 | End: 2023-01-12

## 2023-01-12 RX ORDER — SILDENAFIL 100 MG/1
100 TABLET, FILM COATED ORAL DAILY
Qty: 6 | Refills: 11 | Status: COMPLETED | COMMUNITY
Start: 2019-06-18 | End: 2023-01-12

## 2023-01-12 NOTE — PHYSICAL EXAM
[General Appearance - Well Developed] : well developed [General Appearance - Well Nourished] : well nourished [Normal Appearance] : normal appearance [Well Groomed] : well groomed [General Appearance - In No Acute Distress] : no acute distress [Abdomen Soft] : soft [Abdomen Tenderness] : non-tender [Costovertebral Angle Tenderness] : no ~M costovertebral angle tenderness [Edema] : no peripheral edema [] : no respiratory distress [Respiration, Rhythm And Depth] : normal respiratory rhythm and effort [Exaggerated Use Of Accessory Muscles For Inspiration] : no accessory muscle use [Oriented To Time, Place, And Person] : oriented to person, place, and time [Affect] : the affect was normal [Mood] : the mood was normal [Not Anxious] : not anxious [Normal Station and Gait] : the gait and station were normal for the patient's age [No Focal Deficits] : no focal deficits [FreeTextEntry1] : The knee-chest position was utilized for the NIRMAL. Digital rectal exam found no suspicious rectal masses. Normal seminal vesicles. Anal tone is normal. The prostate is non tender, with normal texture, discrete borders, and no nodules. It is a 50 gram transurethral resection size. No rectal mucosal lesions. No gross blood on the examining finger.\par The knee-chest position was utilized for the NIRMAL. Digital rectal exam found no suspicious rectal masses. Normal seminal vesicles. Anal tone is normal. The prostate is non tender, with normal texture, discrete borders, and no nodules. It is a 50 gram transurethral resection size. No rectal mucosal lesions. No gross blood on the examining finger.\par

## 2023-01-12 NOTE — ADDENDUM
[FreeTextEntry1] : This note was authored by Nereida Huertas working as a scribe for Dr.Gary Lopez. I, Dr. Luis Lopez have reviewed the content of this note and confirm it is true and accurate. I personally performed the history and physical examination and made all the decisions 01/12/2023

## 2023-01-12 NOTE — HISTORY OF PRESENT ILLNESS
[FreeTextEntry1] : Mr. Denis is  a 73 year old male presented with urinary urgency, erectile dysfunction, and history of kidney stones. Patient was seen by me 4 years ago. Noted he had acute flank pain that resolved on its own a few weeks ago. Complained that when he drinks fluids he will have urinary urgency but denied urge incontinence. Patient denies dysuria, gross hematuria, or incontinence.  I advised the patient to increase his water intake for his kidney stones. Complained of erectile dysfunction, erections are not adequate for penetration. Retired. Former smoker, 2 packs daily. \par 8/28/18: The patient returned and reported his urination has improved since taking tamsulosin. Noted he has had been having abdominal pain since starting tamsulosin. We will switch over to Alfuzosin. Denied any nocturia. PSA from 8/7/18 was 2.39 ng/mL.\par 9/24/18: The patient returned and reported Alfuzosin did not improve his urination. Noted he wanted to try tamsulosin again. Discontinued use before due to upset stomach but noted upset stomach may have been from changing his diet and not the tamsulosin. Complained of decreased sexual desire. Testosterone from 8/7/18 was 362.4 ng/dL. 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 We will check hormone levels today. \par 10/16/18: The patient returned today and reported urination has improved since taking Tamsulosin and denies any side effects. Testosterone, adrenal hormones, THS is normal. The patient denies urinary urgency. Wakes up 0-1 time during the night to urinate. \par 6/18/19: Patient presents today for a follow up. Today he reports that his urination is excellent. Tamsulosin 0.4 mg is taken as directed. He is able to take 2 hour drives without having to worry. He was also able to consume a few glasses of beer at a ball game without having to urinate. He is not able to achieve erections and is considering beginning medication at this time.  \par \par 01/09/2020: Patient presents today for a follow up of BPH.  Patient reports he notices his urination is great on flomax. He has good control over his urination. He reports his urgency can be improved. Denies dysuria, hematuria, frequency, incontinence, flank pain, fever, or chills.\par PSA 2.59 from 6/18/2019\par \par 10/28/2021: Patient presents today for his annual urological evaluation. Urination is pretty good on tamsulosin 0.4 mg BID after a meal. He reports that he sometimes forgets to take one dose and will take one in the afternoon if he forgets the morning and then again after dinner. Denies any urge incontinence but sometimes comes close to it. Erections are not adequate but reports he hasn't had many opportunities due to his wife getting sick and needing surgery. He feels there might be a possibility for sex in the future and would like to try a medication to improve his erections.  Last PSA of 6/2019 was 2.59.\par \par 11/29/2021: Patient presents today for follow up. Last visit, was started on Tadalafil 5mg once daily. Took one for three days, however developed stomach aches, headaches, blurred vision, and palpations by the second day, and stopped. States he took Tamsulosin BID once in morning and once at night however still had some urgency. However, switched to two pills at night after dinner which improved his urgency. Stlil having poor erections. \par \par 01/12/2023: Patient reports today for a follow up. He reports feeling well. He recently returning from [place]. The patient continues to take Tamsulosin 0.4mg two pills BID. Pt denies urinary urgency, straining, pushing, gross hematuria, dysuria, or lightheadedness. Nocturia of 2-3 x. Drinks about 30oz of Gatorade daily and cups of tea. Pt reports erections are still not adequate. D/c Tadalafil as patient developed side effects including headaches and stomach cramps. He denies FHx of prostate cancer and kidney stones. +PMHx of kidney stones. Takes aspirin 81 mg 1 daily. The patient was a prior smoker of 45 years, 3 packs a day. He quit 17 years ago. He uses an inhaler that has some nicotine in it but a small amount. PSA of May 2022 was 2.72.

## 2023-01-12 NOTE — ASSESSMENT
[FreeTextEntry1] : Mr. Denis is  a 73 year old male presented with urinary urgency, erectile dysfunction, and history of kidney stones. Patient was seen by me 4 years ago. Noted he had acute flank pain that resolved on its own a few weeks ago. Complained that when he drinks fluids he will have urinary urgency but denied urge incontinence. Patient denies dysuria, gross hematuria, or incontinence.  I advised the patient to increase his water intake for his kidney stones. Complained of erectile dysfunction, erections are not adequate for penetration. Retired. Former smoker, 2 packs daily. \par 8/28/18: The patient returned and reported his urination has improved since taking tamsulosin. Noted he has had been having abdominal pain since starting tamsulosin. We will switch over to Alfuzosin. Denied any nocturia. PSA from 8/7/18 was 2.39 ng/mL.\par 9/24/18: The patient returned and reported Alfuzosin did not improve his urination. Noted he wanted to try tamsulosin again. Discontinued use before due to upset stomach but noted upset stomach may have been from changing his diet and not the tamsulosin. Complained of decreased sexual desire. Testosterone from 8/7/18 was 362.4 ng/dL. 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 We will check hormone levels today. \par 10/16/18: The patient returned today and reported urination has improved since taking Tamsulosin and denies any side effects. Testosterone, adrenal hormones, THS is normal. The patient denies urinary urgency. Wakes up 0-1 time during the night to urinate. \par \par 6/18/19: Patient presents today for a follow up. Today he reports that his urination is excellent. Tamsulosin 0.4 mg is taken as directed. He is able to take 2 hour drives without having to worry. He was also able to consume a few glasses of beer at a ball game without having to urinate. He is not able to achieve erections and is considering beginning medication at this time.  \par \par 01/09/2020: Patient presents today for a follow up of BPH.  Patient reports he notices his urination is great on flomax. He has good control over his urination. He reports his urgency can be improved. Denies dysuria, hematuria, frequency, incontinence, flank pain, fever, or chills.\par PSA 2.59 from 6/18/2019\par \par 10/28/2021: Patient presents today for his annual urological evaluation. Urination is pretty good on tamsulosin 0.4 mg BID after a meal. He reports that he sometimes forgets to take one dose and will take one in the afternoon if he forgets the morning and then again after dinner. Denies any urge incontinence but sometimes comes close to it. Erections are not adequate but reports he hasn't had many opportunities due to his wife getting sick and needing surgery. He feels there might be a possibility for sex in the future and would like to try a medication to improve his erections. Last PSA of 6/2019 was 2.59.\par \par 11/29/2021: Patient presents today for follow up. Last visit, was started on Tadalafil 5mg once daily. Took one for three days, however developed stomach aches, headaches, blurred vision, and palpations by the second day, and stopped. States he took Tamsulosin BID once in morning and once at night however still had some urgency. However, switched to two pills at night after dinner which improved his urgency. Still having poor erections. \par \par Reviewed 10/28/21 lab work with patient in detail. \par D/c Tadalafil as patient developed side effects. \par Continue Tamsulosin 0.4mg two pills after dinner. \par Will be seeing PCP to discuss elevated TSH in January. \par For ED, I have discussed injections and implanted devices as alternatives to Sildenafil or Tadalafil, however patient declines.\par RTO in 6 months for follow up or sooner if new urinary symptoms develop. \par \par 01/12/2023: Patient reports today for a follow up. He reports feeling well. He recently returning from [place]. The patient continues to take Tamsulosin 0.4mg two pills BID. Pt denies urinary urgency, straining, pushing, gross hematuria, dysuria, or lightheadedness. Nocturia of 2-3 x. Drinks about 30oz of Gatorade daily and cups of tea. Pt reports erections are still not adequate. D/c Tadalafil as patient developed side effects including headaches and stomach cramps. He denies FHx of prostate cancer and kidney stones. +PMHx of kidney stones. Takes aspirin 81 mg 1 daily. The patient was a prior smoker of 45 years, 3 packs a day. He quit 17 years ago. He uses an inhaler that has some nicotine in it but a small amount. PSA of May 2022 was 2.72. \par \par The knee-chest position was utilized for the NIRMAL. Digital rectal exam found no suspicious rectal masses. Normal seminal vesicles. Anal tone is normal. The prostate is non tender, with normal texture, discrete borders, and no nodules. It is a 50 gram transurethral resection size. No rectal mucosal lesions. No gross blood on the examining finger.\par \par Blood work today included BMP, alkaline phosphatase, PSA, and testosterone.\par \par Patient produced a urine sample today which will be sent for urinalysis, urine culture, and urine cytology.\par \par I renewed Tamsulosin 0.4 mg once daily after a meal. I reminded the patient the side of effects of nasal congestion and light-headedness. I advised him to use saline nasal spray if he gets congestion but to avoid decongestion medication as this will worsen his urination. I also reminded the patient that if he is sexually active, there will be a decrease in semen volume while taking this medication but this is not harmful.\par \par Pt will RTO in one year, sooner if clinically indicated. \par \par Preparation, in person office visit, and coordination of care: 30 minutes.

## 2023-01-13 LAB
APPEARANCE: CLEAR
BACTERIA: NEGATIVE
BILIRUBIN URINE: NEGATIVE
BLOOD URINE: NEGATIVE
COLOR: NORMAL
GLUCOSE QUALITATIVE U: NEGATIVE
HYALINE CASTS: 0 /LPF
KETONES URINE: NEGATIVE
LEUKOCYTE ESTERASE URINE: NEGATIVE
MICROSCOPIC-UA: NORMAL
NITRITE URINE: NEGATIVE
PH URINE: 6.5
PROTEIN URINE: NEGATIVE
RED BLOOD CELLS URINE: 0 /HPF
SPECIFIC GRAVITY URINE: 1.01
SQUAMOUS EPITHELIAL CELLS: 0 /HPF
UROBILINOGEN URINE: NORMAL
WHITE BLOOD CELLS URINE: 0 /HPF

## 2023-01-14 ENCOUNTER — NON-APPOINTMENT (OUTPATIENT)
Age: 73
End: 2023-01-14

## 2023-01-14 LAB
ALP BLD-CCNC: 75 U/L
ANION GAP SERPL CALC-SCNC: 12 MMOL/L
BACTERIA UR CULT: NORMAL
BUN SERPL-MCNC: 16 MG/DL
CALCIUM SERPL-MCNC: 10.2 MG/DL
CHLORIDE SERPL-SCNC: 102 MMOL/L
CO2 SERPL-SCNC: 28 MMOL/L
CREAT SERPL-MCNC: 0.91 MG/DL
DRE ABNORMAL+AA+ARIS: 65 %
DRE ABNORMAL+AFRICAN ANCESTRY: 47 %
DRE ABNORMAL+ARIS: 58 %
DRE ABNORMAL+FAM HIST+AA+ARIS: 74 %
DRE ABNORMAL+FAM HIST+AA: 58 %
DRE ABNORMAL+FAM HIST+ARIS: 69 %
DRE ABNORMAL+FAM HIST: 52 %
DRE ABNORMAL: 40 %
DRE NORMAL+AA+ARIS: 45 %
DRE NORMAL+AFRICAN ANCESTRY: 28 %
DRE NORMAL+ARIS: 38 %
DRE NORMAL+FAM HIST+AA+ARIS: 55 %
DRE NORMAL+FAM HIST+AA: 37 %
DRE NORMAL+FAM HIST+ARIS: 50 %
DRE NORMAL+FAM HIST: 32 %
DRE NORMAL: 22 %
EGFR: 88 ML/MIN/1.73M2
GLUCOSE SERPL-MCNC: 104 MG/DL
POTASSIUM SERPL-SCNC: 4.7 MMOL/L
PSA RISK STRATIFICATION INTERP: NORMAL
PSA RISK STRATIFICATION: NORMAL
PSA SERPL-MCNC: 3.01 NG/ML
SODIUM SERPL-SCNC: 142 MMOL/L

## 2023-01-18 LAB
TESTOST FREE SERPL-MCNC: 9.2 PG/ML
TESTOST SERPL-MCNC: 533 NG/DL

## 2023-01-26 LAB — URINE CYTOLOGY: NORMAL

## 2024-01-30 ENCOUNTER — APPOINTMENT (OUTPATIENT)
Dept: UROLOGY | Facility: CLINIC | Age: 74
End: 2024-01-30
Payer: MEDICARE

## 2024-01-30 VITALS
WEIGHT: 180 LBS | OXYGEN SATURATION: 93 % | BODY MASS INDEX: 25.77 KG/M2 | SYSTOLIC BLOOD PRESSURE: 191 MMHG | DIASTOLIC BLOOD PRESSURE: 67 MMHG | HEIGHT: 70 IN | TEMPERATURE: 97.7 F | HEART RATE: 58 BPM

## 2024-01-30 VITALS — DIASTOLIC BLOOD PRESSURE: 71 MMHG | SYSTOLIC BLOOD PRESSURE: 193 MMHG

## 2024-01-30 PROCEDURE — 99214 OFFICE O/P EST MOD 30 MIN: CPT

## 2024-01-30 RX ORDER — TROSPIUM CHLORIDE 20 MG/1
20 TABLET, FILM COATED ORAL
Qty: 30 | Refills: 11 | Status: ACTIVE | COMMUNITY
Start: 2024-01-30 | End: 1900-01-01

## 2024-01-30 RX ORDER — SILDENAFIL 100 MG/1
100 TABLET, FILM COATED ORAL DAILY
Qty: 30 | Refills: 11 | Status: ACTIVE | COMMUNITY
Start: 2024-01-30 | End: 1900-01-01

## 2024-01-30 RX ORDER — TAMSULOSIN HYDROCHLORIDE 0.4 MG/1
0.4 CAPSULE ORAL
Qty: 180 | Refills: 3 | Status: ACTIVE | COMMUNITY
Start: 2018-08-07 | End: 1900-01-01

## 2024-01-30 NOTE — PHYSICAL EXAM
[Normal Appearance] : normal appearance [Well Groomed] : well groomed [General Appearance - In No Acute Distress] : no acute distress [Edema] : no peripheral edema [Respiration, Rhythm And Depth] : normal respiratory rhythm and effort [Exaggerated Use Of Accessory Muscles For Inspiration] : no accessory muscle use [Abdomen Soft] : soft [Abdomen Tenderness] : non-tender [Costovertebral Angle Tenderness] : no ~M costovertebral angle tenderness [Normal Station and Gait] : the gait and station were normal for the patient's age [] : no rash [No Focal Deficits] : no focal deficits [Oriented To Time, Place, And Person] : oriented to person, place, and time [Affect] : the affect was normal [Mood] : the mood was normal [de-identified] : The knee-chest position was utilized for the NIRMAL. Digital rectal exam found no suspicious rectal masses. Normal seminal vesicles. Anal tone is normal. The prostate is non tender, with normal texture, discrete borders, and no nodules. Right lobe is a little more prominent than the left. It is a 20 gram transurethral resection size. No rectal mucosal lesions. No gross blood on the examining finger.

## 2024-01-30 NOTE — HISTORY OF PRESENT ILLNESS
[FreeTextEntry1] : Mr. Bean is  a 74 year old male presented with urinary urgency, erectile dysfunction, and history of kidney stones. Patient was seen by me 4 years ago. Noted he had acute flank pain that resolved on its own a few weeks ago. Complained that when he drinks fluids he will have urinary urgency but denied urge incontinence. Patient denies dysuria, gross hematuria, or incontinence.  I advised the patient to increase his water intake for his kidney stones. Complained of erectile dysfunction, erections are not adequate for penetration. Retired. Former smoker, 2 packs daily.  8/28/18: The patient returned and reported his urination has improved since taking tamsulosin. Noted he has had been having abdominal pain since starting tamsulosin. We will switch over to Alfuzosin. Denied any nocturia. PSA from 8/7/18 was 2.39 ng/mL. 9/24/18: The patient returned and reported Alfuzosin did not improve his urination. Noted he wanted to try tamsulosin again. Discontinued use before due to upset stomach but noted upset stomach may have been from changing his diet and not the tamsulosin. Complained of decreased sexual desire. Testosterone from 8/7/18 was 362.4 ng/dL. 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 We will check hormone levels today.  10/16/18: The patient returned today and reported urination has improved since taking Tamsulosin and denies any side effects. Testosterone, adrenal hormones, THS is normal. The patient denies urinary urgency. Wakes up 0-1 time during the night to urinate.  6/18/19: Patient presents today for a follow up. Today he reports that his urination is excellent. Tamsulosin 0.4 mg is taken as directed. He is able to take 2 hour drives without having to worry. He was also able to consume a few glasses of beer at a ball game without having to urinate. He is not able to achieve erections and is considering beginning medication at this time.    01/09/2020: Patient presents today for a follow up of BPH.  Patient reports he notices his urination is great on flomax. He has good control over his urination. He reports his urgency can be improved. Denies dysuria, hematuria, frequency, incontinence, flank pain, fever, or chills. PSA 2.59 from 6/18/2019  10/28/2021: Patient presents today for his annual urological evaluation. Urination is pretty good on tamsulosin 0.4 mg BID after a meal. He reports that he sometimes forgets to take one dose and will take one in the afternoon if he forgets the morning and then again after dinner. Denies any urge incontinence but sometimes comes close to it. Erections are not adequate but reports he hasn't had many opportunities due to his wife getting sick and needing surgery. He feels there might be a possibility for sex in the future and would like to try a medication to improve his erections.  Last PSA of 6/2019 was 2.59.  11/29/2021: Patient presents today for follow up. Last visit, was started on Tadalafil 5mg once daily. Took one for three days, however developed stomach aches, headaches, blurred vision, and palpations by the second day, and stopped. States he took Tamsulosin BID once in morning and once at night however still had some urgency. However, switched to two pills at night after dinner which improved his urgency. Stlil having poor erections.   01/12/2023: Patient reports today for a follow up. He reports feeling well. He recently returning from [place]. The patient continues to take Tamsulosin 0.4mg two pills BID. Pt denies urinary urgency, straining, pushing, gross hematuria, dysuria, or lightheadedness. Nocturia of 2-3 x. Drinks about 30oz of Gatorade daily and cups of tea. Pt reports erections are still not adequate. D/c Tadalafil as patient developed side effects including headaches and stomach cramps. He denies FHx of prostate cancer and kidney stones. +PMHx of kidney stones. Takes aspirin 81 mg 1 daily. The patient was a prior smoker of 45 years, 3 packs a day. He quit 17 years ago. He uses an inhaler that has some nicotine in it but a small amount. PSA of May 2022 was 2.72.   01/30/2024: Mr. GABBI BEAN presents today for a follow up. Pt has been doing well, however he was disturbed at his blood pressure today which was unusually high at 191/67. States he is usually always around 120/75. Patient reports that his urination remains fairly good on tamsulosin 0.4 mg two tablets 15-20 minutes after dinner. Reports that he does not have urgency of urination until he stands up. When he stands he realizes he needs to go right away and sometimes has urge incontinence. Sexual desire remains low. Sexual function is not adequate. Has tried tadalafil in the past and got horrible cramps when he took it. Has not tried sildenafil. Denies any pain in the testicles or penis. +PMhx of kidney stones. Drinks about 3 quarts of water, gatorade, and about 1 cup of coffee a day.

## 2024-01-30 NOTE — ASSESSMENT
[FreeTextEntry1] : Mr. Bean is a 73 year old male presented with urinary urgency, erectile dysfunction, and history of kidney stones. Patient was seen by me 4 years ago. Noted he had acute flank pain that resolved on its own a few weeks ago. Complained that when he drinks fluids he will have urinary urgency but denied urge incontinence. Patient denies dysuria, gross hematuria, or incontinence. I advised the patient to increase his water intake for his kidney stones. Complained of erectile dysfunction, erections are not adequate for penetration. Retired. Former smoker, 2 packs daily.  8/28/18: The patient returned and reported his urination has improved since taking tamsulosin. Noted he has had been having abdominal pain since starting tamsulosin. We will switch over to Alfuzosin. Denied any nocturia. PSA from 8/7/18 was 2.39 ng/mL.  9/24/18: The patient returned and reported Alfuzosin did not improve his urination. Noted he wanted to try tamsulosin again. Discontinued use before due to upset stomach but noted upset stomach may have been from changing his diet and not the tamsulosin. Complained of decreased sexual desire. Testosterone from 8/7/18 was 362.4 ng/dL. 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 We will check hormone levels today.  10/16/18: The patient returned today and reported urination has improved since taking Tamsulosin and denies any side effects. Testosterone, adrenal hormones, THS is normal. The patient denies urinary urgency. Wakes up 0-1 time during the night to urinate.    6/18/19: Patient presents today for a follow up. Today he reports that his urination is excellent. Tamsulosin 0.4 mg is taken as directed. He is able to take 2 hour drives without having to worry. He was also able to consume a few glasses of beer at a ball game without having to urinate. He is not able to achieve erections and is considering beginning medication at this time.    01/09/2020: Patient presents today for a follow up of BPH. Patient reports he notices his urination is great on flomax. He has good control over his urination. He reports his urgency can be improved. Denies dysuria, hematuria, frequency, incontinence, flank pain, fever, or chills.  PSA 2.59 from 6/18/2019    10/28/2021: Patient presents today for his annual urological evaluation. Urination is pretty good on tamsulosin 0.4 mg BID after a meal. He reports that he sometimes forgets to take one dose and will take one in the afternoon if he forgets the morning and then again after dinner. Denies any urge incontinence but sometimes comes close to it. Erections are not adequate but reports he hasn't had many opportunities due to his wife getting sick and needing surgery. He feels there might be a possibility for sex in the future and would like to try a medication to improve his erections. Last PSA of 6/2019 was 2.59.    11/29/2021: Patient presents today for follow up. Last visit, was started on Tadalafil 5mg once daily. Took one for three days, however developed stomach aches, headaches, blurred vision, and palpations by the second day, and stopped. States he took Tamsulosin BID once in morning and once at night however still had some urgency. However, switched to two pills at night after dinner which improved his urgency. Still having poor erections.    Reviewed 10/28/21 lab work with patient in detail.  D/c Tadalafil as patient developed side effects.  Continue Tamsulosin 0.4mg two pills after dinner.  Will be seeing PCP to discuss elevated TSH in January.  For ED, I have discussed injections and implanted devices as alternatives to Sildenafil or Tadalafil, however patient declines.  RTO in 6 months for follow up or sooner if new urinary symptoms develop.    01/12/2023: Patient reports today for a follow up. He reports feeling well. He recently returning from [place]. The patient continues to take Tamsulosin 0.4mg two pills BID. Pt denies urinary urgency, straining, pushing, gross hematuria, dysuria, or lightheadedness. Nocturia of 2-3 x. Drinks about 30oz of Gatorade daily and cups of tea. Pt reports erections are still not adequate. D/c Tadalafil as patient developed side effects including headaches and stomach cramps. He denies FHx of prostate cancer and kidney stones. +PMHx of kidney stones. Takes aspirin 81 mg 1 daily. The patient was a prior smoker of 45 years, 3 packs a day. He quit 17 years ago. He uses an inhaler that has some nicotine in it but a small amount. PSA of May 2022 was 2.72.  The knee-chest position was utilized for the NIRMAL. Digital rectal exam found no suspicious rectal masses. Normal seminal vesicles. Anal tone is normal. The prostate is non tender, with normal texture, discrete borders, and no nodules. It is a 50 gram transurethral resection size. No rectal mucosal lesions. No gross blood on the examining finger.  Blood work today included BMP, alkaline phosphatase, PSA, and testosterone. Patient produced a urine sample today which will be sent for urinalysis, urine culture, and urine cytology. I renewed Tamsulosin 0.4 mg once daily after a meal. I reminded the patient the side of effects of nasal congestion and light-headedness. I advised him to use saline nasal spray if he gets congestion but to avoid decongestion medication as this will worsen his urination. I also reminded the patient that if he is sexually active, there will be a decrease in semen volume while taking this medication but this is not harmful. Pt will RTO in one year, sooner if clinically indicated.  01/30/2024: Mr. GABBI BAEN presents today for a follow up. Pt has been doing well, however he was disturbed at his blood pressure today which was unusually high at 191/67. States he is usually always around 120/75. Patient reports that his urination remains fairly good on tamsulosin 0.4 mg two tablets 15-20 minutes after dinner. Reports that he does not have urgency of urination until he stands up. When he stands he realizes he needs to go right away and sometimes has urge incontinence. Sexual desire remains low. Sexual function is not adequate. Has tried tadalafil in the past and got horrible cramps when he took it. Has not tried sildenafil. Denies any pain in the testicles or penis. +PMhx of kidney stones. Drinks about 3 quarts of water, gatorade, and about 1 cup of coffee a day.   The knee-chest position was utilized for the NIRMAL. Digital rectal exam found no suspicious rectal masses. Normal seminal vesicles. Anal tone is normal. The prostate is non tender, with normal texture, discrete borders, and no nodules. Right lobe is a little more prominent than the left. It is a 20 gram transurethral resection size. No rectal mucosal lesions. No gross blood on the examining finger.   The patient produced a urine sample which will be sent for urinalysis, urine cytology, and urine culture.   Blood work today includes CMP, Cystatin C, dihydrotestosterone, estradiol, estrogen total, prolactin, SHBG, serum osmolality, PSA profile, testosterone free and total, and uric acid.   Renewed tamsulosin 0.4 mg BID.   I prescribed the patient Trospium 20 mg, one tablet once daily at bedtime. I informed the patient there may be constipation as a side effect.   I prescribed the patient sildenafil 100 mg one hour prior to sex. I advised him that side effects may include nasal congestion, warm flush feeling, and blurred vision. More serious side effects are loss of vision or hearing, in which he should stop the medication immediately. If an erection lasts more than 4 hours, the patient should see a doctor immediately.  Patient will have a telehealth visit in 2-3 weeks for reassessment, sooner if clinically indicated.    Preparation, in person office visit, and coordination of care took 30 minutes.

## 2024-02-02 LAB
ALBUMIN SERPL ELPH-MCNC: 5 G/DL
ALP BLD-CCNC: 70 U/L
ALT SERPL-CCNC: 28 U/L
ANION GAP SERPL CALC-SCNC: 17 MMOL/L
APPEARANCE: CLEAR
AST SERPL-CCNC: 30 U/L
BACTERIA UR CULT: NORMAL
BACTERIA: NEGATIVE /HPF
BILIRUB SERPL-MCNC: 0.8 MG/DL
BILIRUBIN URINE: NEGATIVE
BLOOD URINE: ABNORMAL
BUN SERPL-MCNC: 16 MG/DL
CA-I SERPL-SCNC: 4.9 MG/DL
CALCIUM SERPL-MCNC: 9.5 MG/DL
CAST: 0 /LPF
CHLORIDE SERPL-SCNC: 101 MMOL/L
CO2 SERPL-SCNC: 22 MMOL/L
COLOR: YELLOW
CREAT SERPL-MCNC: 0.8 MG/DL
CYSTATIN C SERPL-MCNC: 1.09 MG/L
EGFR: 92 ML/MIN/1.73M2
EPITHELIAL CELLS: 1 /HPF
ESTRADIOL SERPL-MCNC: 26 PG/ML
GFR/BSA.PRED SERPLBLD CYS-BASED-ARV: 65 ML/MIN/1.73M2
GLUCOSE QUALITATIVE U: NEGATIVE MG/DL
GLUCOSE SERPL-MCNC: 107 MG/DL
KETONES URINE: NEGATIVE MG/DL
LEUKOCYTE ESTERASE URINE: NEGATIVE
MAGNESIUM SERPL-MCNC: 2.1 MG/DL
MICROSCOPIC-UA: NORMAL
NITRITE URINE: NEGATIVE
OSMOLALITY SERPL: 286 MOSMOL/KG
PH URINE: 6
PHOSPHATE SERPL-MCNC: 3.5 MG/DL
POTASSIUM SERPL-SCNC: 4.6 MMOL/L
PROLACTIN SERPL-MCNC: 5.3 NG/ML
PROT SERPL-MCNC: 7.7 G/DL
PROTEIN URINE: NEGATIVE MG/DL
PSA FREE FLD-MCNC: 30 %
PSA FREE SERPL-MCNC: 1.08 NG/ML
PSA SERPL-MCNC: 3.62 NG/ML
RED BLOOD CELLS URINE: 1 /HPF
REVIEW: NORMAL
SHBG SERPL-SCNC: 67 NMOL/L
SODIUM SERPL-SCNC: 141 MMOL/L
SPECIFIC GRAVITY URINE: 1.01
TESTOST FREE SERPL-MCNC: 12.7 PG/ML
TESTOST SERPL-MCNC: 502 NG/DL
URATE SERPL-MCNC: 5.9 MG/DL
URINE CYTOLOGY: NORMAL
UROBILINOGEN URINE: 0.2 MG/DL
WHITE BLOOD CELLS URINE: 1 /HPF

## 2024-02-04 LAB
CREAT UR-MCNC: 62 MG/DL
ESTROGEN SERPL-MCNC: 357 PG/ML
OXALATE RANDOM URINE CREATININE: 14.5 MG/G CREAT
OXALATE UR-SCNC: 9 MG/L

## 2024-02-05 LAB — ANDROSTANOLONE SERPL-MCNC: 64 NG/DL

## 2024-02-13 ENCOUNTER — APPOINTMENT (OUTPATIENT)
Dept: UROLOGY | Facility: CLINIC | Age: 74
End: 2024-02-13
Payer: MEDICARE

## 2024-02-13 DIAGNOSIS — N20.0 CALCULUS OF KIDNEY: ICD-10-CM

## 2024-02-13 DIAGNOSIS — N52.9 MALE ERECTILE DYSFUNCTION, UNSPECIFIED: ICD-10-CM

## 2024-02-13 DIAGNOSIS — N40.1 BENIGN PROSTATIC HYPERPLASIA WITH LOWER URINARY TRACT SYMPMS: ICD-10-CM

## 2024-02-13 DIAGNOSIS — R68.82 DECREASED LIBIDO: ICD-10-CM

## 2024-02-13 DIAGNOSIS — N13.8 BENIGN PROSTATIC HYPERPLASIA WITH LOWER URINARY TRACT SYMPMS: ICD-10-CM

## 2024-02-13 DIAGNOSIS — R39.15 URGENCY OF URINATION: ICD-10-CM

## 2024-02-13 PROCEDURE — 99215 OFFICE O/P EST HI 40 MIN: CPT

## 2024-02-13 RX ORDER — VIBEGRON 75 MG/1
75 TABLET, FILM COATED ORAL
Qty: 30 | Refills: 11 | Status: ACTIVE | COMMUNITY
Start: 2024-02-13 | End: 1900-01-01

## 2024-02-13 NOTE — PHYSICAL EXAM
[General Appearance - Well Developed] : well developed [General Appearance - Well Nourished] : well nourished [Normal Appearance] : normal appearance [Well Groomed] : well groomed [General Appearance - In No Acute Distress] : no acute distress [Respiration, Rhythm And Depth] : normal respiratory rhythm and effort [Exaggerated Use Of Accessory Muscles For Inspiration] : no accessory muscle use [] : no rash [Oriented To Time, Place, And Person] : oriented to person, place, and time [Affect] : the affect was normal [Mood] : the mood was normal

## 2024-02-23 PROBLEM — N40.1 BENIGN LOCALIZED HYPERPLASIA OF PROSTATE WITH URINARY OBSTRUCTION: Status: ACTIVE | Noted: 2018-08-07

## 2024-02-23 PROBLEM — R39.15 URGENCY OF URINATION: Status: ACTIVE | Noted: 2024-01-30

## 2024-02-23 PROBLEM — N52.9 MALE ERECTILE DISORDER OF ORGANIC ORIGIN: Status: ACTIVE | Noted: 2018-08-28

## 2024-02-23 NOTE — ADDENDUM
[FreeTextEntry1] : This note was authored by Nereida Huertas working as a scribe for Dr. Luis Lopez. I, Dr. Luis Lopez have reviewed the content of this note and confirm it is true and accurate. I personally performed the history and physical examination and made all the decisions 02/13/2024.

## 2024-02-23 NOTE — HISTORY OF PRESENT ILLNESS
[FreeTextEntry1] : Mr. Bean is  a 74 year old male presented with urinary urgency, erectile dysfunction, and history of kidney stones. Patient was seen by me 4 years ago. Noted he had acute flank pain that resolved on its own a few weeks ago. Complained that when he drinks fluids he will have urinary urgency but denied urge incontinence. Patient denies dysuria, gross hematuria, or incontinence.  I advised the patient to increase his water intake for his kidney stones. Complained of erectile dysfunction, erections are not adequate for penetration. Retired. Former smoker, 2 packs daily.  8/28/18: The patient returned and reported his urination has improved since taking tamsulosin. Noted he has had been having abdominal pain since starting tamsulosin. We will switch over to Alfuzosin. Denied any nocturia. PSA from 8/7/18 was 2.39 ng/mL. 9/24/18: The patient returned and reported Alfuzosin did not improve his urination. Noted he wanted to try tamsulosin again. Discontinued use before due to upset stomach but noted upset stomach may have been from changing his diet and not the tamsulosin. Complained of decreased sexual desire. Testosterone from 8/7/18 was 362.4 ng/dL. 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 We will check hormone levels today.  10/16/18: The patient returned today and reported urination has improved since taking Tamsulosin and denies any side effects. Testosterone, adrenal hormones, THS is normal. The patient denies urinary urgency. Wakes up 0-1 time during the night to urinate.  6/18/19: Patient presents today for a follow up. Today he reports that his urination is excellent. Tamsulosin 0.4 mg is taken as directed. He is able to take 2 hour drives without having to worry. He was also able to consume a few glasses of beer at a ball game without having to urinate. He is not able to achieve erections and is considering beginning medication at this time.    01/09/2020: Patient presents today for a follow up of BPH.  Patient reports he notices his urination is great on flomax. He has good control over his urination. He reports his urgency can be improved. Denies dysuria, hematuria, frequency, incontinence, flank pain, fever, or chills. PSA 2.59 from 6/18/2019  10/28/2021: Patient presents today for his annual urological evaluation. Urination is pretty good on tamsulosin 0.4 mg BID after a meal. He reports that he sometimes forgets to take one dose and will take one in the afternoon if he forgets the morning and then again after dinner. Denies any urge incontinence but sometimes comes close to it. Erections are not adequate but reports he hasn't had many opportunities due to his wife getting sick and needing surgery. He feels there might be a possibility for sex in the future and would like to try a medication to improve his erections.  Last PSA of 6/2019 was 2.59.  11/29/2021: Patient presents today for follow up. Last visit, was started on Tadalafil 5mg once daily. Took one for three days, however developed stomach aches, headaches, blurred vision, and palpations by the second day, and stopped. States he took Tamsulosin BID once in morning and once at night however still had some urgency. However, switched to two pills at night after dinner which improved his urgency. Stlil having poor erections.   01/12/2023: Patient reports today for a follow up. He reports feeling well. He recently returning from [place]. The patient continues to take Tamsulosin 0.4mg two pills BID. Pt denies urinary urgency, straining, pushing, gross hematuria, dysuria, or lightheadedness. Nocturia of 2-3 x. Drinks about 30oz of Gatorade daily and cups of tea. Pt reports erections are still not adequate. D/c Tadalafil as patient developed side effects including headaches and stomach cramps. He denies FHx of prostate cancer and kidney stones. +PMHx of kidney stones. Takes aspirin 81 mg 1 daily. The patient was a prior smoker of 45 years, 3 packs a day. He quit 17 years ago. He uses an inhaler that has some nicotine in it but a small amount. PSA of May 2022 was 2.72.   01/30/2024: Mr. GABBI BEAN presents today for a follow up. Pt has been doing well, however he was disturbed at his blood pressure today which was unusually high at 191/67. States he is usually always around 120/75. Patient reports that his urination remains fairly good on tamsulosin 0.4 mg two tablets 15-20 minutes after dinner. Reports that he does not have urgency of urination until he stands up. When he stands he realizes he needs to go right away and sometimes has urge incontinence. Sexual desire remains low. Sexual function is not adequate. Has tried tadalafil in the past and got horrible cramps when he took it. Has not tried sildenafil. Denies any pain in the testicles or penis. +PMhx of kidney stones. Drinks about 3 quarts of water, gatorade, and about 1 cup of coffee a day.   02/13/2024: Mr. BEAN presents today for a follow up audio-visual Facetime telehealth visit for which they gave permission for. The patient was located at home [address] [place] [address] [place] [address] and I was located in my office in [place], [place]. The patient obtained lab work /24 prior to today's visit. UA showed small blood. Cytology is negative for high grade urothelial carcinoma and specimen consists of 3-dimensional clusters of urothelial cells, red blood cells and white blood cells. Culture showed <10,000 CFU/mL Normal Urogenital Lacy. Estrogen very elevated at 357, Sex Hormone Binding Globulin was 67, Estradiol is 26, Prolactin is 5.3. PSA level is good at 3.62 and Testosterone free is normal at 12.7. eGFR by Cystatin C is 65 mL/min/1.73m2. CMP revealed elevated glucose at 107 and Creatinine is normal at 0.80. Uric acid serum is normal at 5.9 and Calcium ionized is 4.9. Patient reports feeling okay but does have a head cold. The patient continues to take Trospium but it did not result in any improvement. Endorses occasional leakage and urgency.  Stream is strong and feels he empties his bladder completely. He continues on Tamsulosin 0.4 twice at night and Sildenafil. His Bp usually runs 120/72 but at our last visit his Bp was elevated being 193/71, taken by a machine. Patient notes being somewhat concerned about continuing sildenafil due to spike of Bp. I assured him it is safe to take as his bp lately has been normal and he can consult with his PCP as well. Pt takes multivitamin daily. Libido is very minimal and could use improvement.

## 2024-02-23 NOTE — ASSESSMENT
[FreeTextEntry1] : Mr. Bean is a 73 year old male presented with urinary urgency, erectile dysfunction, and history of kidney stones. Patient was seen by me 4 years ago. Noted he had acute flank pain that resolved on its own a few weeks ago. Complained that when he drinks fluids he will have urinary urgency but denied urge incontinence. Patient denies dysuria, gross hematuria, or incontinence. I advised the patient to increase his water intake for his kidney stones. Complained of erectile dysfunction, erections are not adequate for penetration. Retired. Former smoker, 2 packs daily.  8/28/18: The patient returned and reported his urination has improved since taking tamsulosin. Noted he has had been having abdominal pain since starting tamsulosin. We will switch over to Alfuzosin. Denied any nocturia. PSA from 8/7/18 was 2.39 ng/mL.  9/24/18: The patient returned and reported Alfuzosin did not improve his urination. Noted he wanted to try tamsulosin again. Discontinued use before due to upset stomach but noted upset stomach may have been from changing his diet and not the tamsulosin. Complained of decreased sexual desire. Testosterone from 8/7/18 was 362.4 ng/dL. 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 We will check hormone levels today.  10/16/18: The patient returned today and reported urination has improved since taking Tamsulosin and denies any side effects. Testosterone, adrenal hormones, THS is normal. The patient denies urinary urgency. Wakes up 0-1 time during the night to urinate.    6/18/19: Patient presents today for a follow up. Today he reports that his urination is excellent. Tamsulosin 0.4 mg is taken as directed. He is able to take 2 hour drives without having to worry. He was also able to consume a few glasses of beer at a ball game without having to urinate. He is not able to achieve erections and is considering beginning medication at this time.  01/09/2020: Patient presents today for a follow up of BPH. Patient reports he notices his urination is great on flomax. He has good control over his urination. He reports his urgency can be improved. Denies dysuria, hematuria, frequency, incontinence, flank pain, fever, or chills.  PSA 2.59 from 6/18/2019  10/28/2021: Patient presents today for his annual urological evaluation. Urination is pretty good on tamsulosin 0.4 mg BID after a meal. He reports that he sometimes forgets to take one dose and will take one in the afternoon if he forgets the morning and then again after dinner. Denies any urge incontinence but sometimes comes close to it. Erections are not adequate but reports he hasn't had many opportunities due to his wife getting sick and needing surgery. He feels there might be a possibility for sex in the future and would like to try a medication to improve his erections. Last PSA of 6/2019 was 2.59.  11/29/2021: Patient presents today for follow up. Last visit, was started on Tadalafil 5mg once daily. Took one for three days, however developed stomach aches, headaches, blurred vision, and palpations by the second day, and stopped. States he took Tamsulosin BID once in morning and once at night however still had some urgency. However, switched to two pills at night after dinner which improved his urgency. Still having poor erections.  Reviewed 10/28/21 lab work with patient in detail. D/c Tadalafil as patient developed side effects. Continue Tamsulosin 0.4mg two pills after dinner. Will be seeing PCP to discuss elevated TSH in January. For ED, I have discussed injections and implanted devices as alternatives to Sildenafil or Tadalafil, however patient declines. RTO in 6 months for follow up or sooner if new urinary symptoms develop.  01/12/2023: Patient reports today for a follow up. He reports feeling well. He recently returning from [place]. The patient continues to take Tamsulosin 0.4mg two pills BID. Pt denies urinary urgency, straining, pushing, gross hematuria, dysuria, or lightheadedness. Nocturia of 2-3 x. Drinks about 30oz of Gatorade daily and cups of tea. Pt reports erections are still not adequate. D/c Tadalafil as patient developed side effects including headaches and stomach cramps. He denies FHx of prostate cancer and kidney stones. +PMHx of kidney stones. Takes aspirin 81 mg 1 daily. The patient was a prior smoker of 45 years, 3 packs a day. He quit 17 years ago. He uses an inhaler that has some nicotine in it but a small amount. PSA of May 2022 was 2.72.  The knee-chest position was utilized for the NIRMAL. Digital rectal exam found no suspicious rectal masses. Normal seminal vesicles. Anal tone is normal. The prostate is non tender, with normal texture, discrete borders, and no nodules. It is a 50 gram transurethral resection size. No rectal mucosal lesions. No gross blood on the examining finger.  Blood work today included BMP, alkaline phosphatase, PSA, and testosterone. Patient produced a urine sample today which will be sent for urinalysis, urine culture, and urine cytology. I renewed Tamsulosin 0.4 mg once daily after a meal. I reminded the patient the side of effects of nasal congestion and light-headedness. I advised him to use saline nasal spray if he gets congestion but to avoid decongestion medication as this will worsen his urination. I also reminded the patient that if he is sexually active, there will be a decrease in semen volume while taking this medication but this is not harmful. Pt will RTO in one year, sooner if clinically indicated.  01/30/2024: Mr. GABBI BEAN presents today for a follow up. Pt has been doing well, however he was disturbed at his blood pressure today which was unusually high at 191/67. States he is usually always around 120/75. Patient reports that his urination remains fairly good on tamsulosin 0.4 mg two tablets 15-20 minutes after dinner. Reports that he does not have urgency of urination until he stands up. When he stands he realizes he needs to go right away and sometimes has urge incontinence. Sexual desire remains low. Sexual function is not adequate. Has tried tadalafil in the past and got horrible cramps when he took it. Has not tried sildenafil. Denies any pain in the testicles or penis. +PMhx of kidney stones. Drinks about 3 quarts of water, gatorade, and about 1 cup of coffee a day.   The knee-chest position was utilized for the NIRMAL. Digital rectal exam found no suspicious rectal masses. Normal seminal vesicles. Anal tone is normal. The prostate is non tender, with normal texture, discrete borders, and no nodules. Right lobe is a little more prominent than the left. It is a 20 gram transurethral resection size. No rectal mucosal lesions. No gross blood on the examining finger.   The patient produced a urine sample which will be sent for urinalysis, urine cytology, and urine culture.  Blood work today includes CMP, Cystatin C, dihydrotestosterone, estradiol, estrogen total, prolactin, SHBG, serum osmolality, PSA profile, testosterone free and total, and uric acid.  Renewed tamsulosin 0.4 mg BID.  I prescribed the patient Trospium 20 mg, one tablet once daily at bedtime. I informed the patient there may be constipation as a side effect.  I prescribed the patient sildenafil 100 mg one hour prior to sex. I advised him that side effects may include nasal congestion, warm flush feeling, and blurred vision. More serious side effects are loss of vision or hearing, in which he should stop the medication immediately. If an erection lasts more than 4 hours, the patient should see a doctor immediately. Patient will have a telehealth visit in 2-3 weeks for reassessment, sooner if clinically indicated.    02/13/2024: Mr. BEAN presents today for a follow up audio-visual Facetime telehealth visit for which they gave permission for. The patient was located at home [address] [place] [address] [place] [address] and I was located in my office in [place], [place]. The patient obtained lab work /24 prior to today's visit. UA showed small blood. Cytology is negative for high grade urothelial carcinoma and specimen consists of 3-dimensional clusters of urothelial cells, red blood cells and white blood cells. Culture showed <10,000 CFU/mL Normal Urogenital Lacy. Estrogen very elevated at 357, Sex Hormone Binding Globulin was 67, Estradiol is 26, Prolactin is 5.3. PSA level is good at 3.62 and Testosterone free is normal at 12.7. eGFR by Cystatin C is 65 mL/min/1.73m2. CMP revealed elevated glucose at 107 and Creatinine is normal at 0.80. Uric acid serum is normal at 5.9 and Calcium ionized is 4.9. Patient reports feeling okay but does have a head cold. The patient continues to take Trospium but it did not result in any improvement. Endorses occasional leakage and urgency.  Stream is strong and feels he empties his bladder completely. He continues on Tamsulosin 0.4 twice at night and Sildenafil. His Bp usually runs 120/72 but at our last visit his Bp was elevated being 193/71, taken by a machine. Patient notes being somewhat concerned about continuing sildenafil due to spike of Bp. I assured him it is safe to take as his bp lately has been normal and he can consult with his PCP as well. Pt takes multivitamin daily. Libido is very minimal and could use improvement.   Reviewed and discussed laboratory work of 1/30/24 which He obtained prior to today's visit as requested. Patient will send recent lab of work obtained from his PCP including estrogen, LH and FSH levels. We provided the office email.   I prescribed the patient Gemtesa 75 mg one tablet once daily.   Pt will schedule a telehealth visit in 4 weeks for assessment of urination while on Gemtesa and to review lab results.   Preparation, audio-visual visit, and coordination of care took 40 Minutes.

## 2024-03-08 ENCOUNTER — APPOINTMENT (OUTPATIENT)
Dept: UROLOGY | Facility: CLINIC | Age: 74
End: 2024-03-08

## 2024-08-05 ENCOUNTER — APPOINTMENT (OUTPATIENT)
Dept: UROLOGY | Facility: CLINIC | Age: 74
End: 2024-08-05

## 2024-08-06 ENCOUNTER — APPOINTMENT (OUTPATIENT)
Dept: UROLOGY | Facility: CLINIC | Age: 74
End: 2024-08-06

## 2025-01-17 DIAGNOSIS — Z00.00 ENCOUNTER FOR GENERAL ADULT MEDICAL EXAMINATION W/OUT ABNORMAL FINDINGS: ICD-10-CM

## 2025-01-19 LAB
ALBUMIN SERPL ELPH-MCNC: 4.7 G/DL
ALP BLD-CCNC: 80 U/L
ALT SERPL-CCNC: 25 U/L
ANION GAP SERPL CALC-SCNC: 16 MMOL/L
APPEARANCE: CLEAR
AST SERPL-CCNC: 24 U/L
BACTERIA: NEGATIVE /HPF
BILIRUB SERPL-MCNC: 0.5 MG/DL
BILIRUBIN URINE: NEGATIVE
BLOOD URINE: NEGATIVE
BUN SERPL-MCNC: 19 MG/DL
CALCIUM SERPL-MCNC: 9.8 MG/DL
CAST: 0 /LPF
CHLORIDE SERPL-SCNC: 103 MMOL/L
CO2 SERPL-SCNC: 23 MMOL/L
COLOR: YELLOW
CREAT SERPL-MCNC: 0.87 MG/DL
EGFR: 89 ML/MIN/1.73M2
EPITHELIAL CELLS: 0 /HPF
ESTRADIOL SERPL-MCNC: 25 PG/ML
GLUCOSE QUALITATIVE U: NEGATIVE MG/DL
GLUCOSE SERPL-MCNC: 120 MG/DL
KETONES URINE: NEGATIVE MG/DL
LEUKOCYTE ESTERASE URINE: NEGATIVE
MAGNESIUM SERPL-MCNC: 2.2 MG/DL
MICROSCOPIC-UA: NORMAL
NITRITE URINE: NEGATIVE
OSMOLALITY SERPL: 299 MOSMOL/KG
PH URINE: 6
PHOSPHATE SERPL-MCNC: 3.3 MG/DL
POTASSIUM SERPL-SCNC: 4.6 MMOL/L
PROLACTIN SERPL-MCNC: 5.9 NG/ML
PROT SERPL-MCNC: 7.5 G/DL
PROTEIN URINE: NEGATIVE MG/DL
PSA FREE FLD-MCNC: 25 %
PSA FREE SERPL-MCNC: 0.82 NG/ML
PSA SERPL-MCNC: 3.31 NG/ML
RED BLOOD CELLS URINE: 0 /HPF
SHBG SERPL-SCNC: 56.1 NMOL/L
SODIUM SERPL-SCNC: 142 MMOL/L
SPECIFIC GRAVITY URINE: 1.01
URATE SERPL-MCNC: 6.7 MG/DL
UROBILINOGEN URINE: 1 MG/DL
WHITE BLOOD CELLS URINE: 0 /HPF

## 2025-01-21 LAB
TESTOST FREE SERPL-MCNC: 9.4 PG/ML
TESTOST SERPL-MCNC: 546 NG/DL

## 2025-01-22 LAB — ESTROGEN SERPL-MCNC: 122 PG/ML

## 2025-01-24 LAB — DHEA SERPL-MCNC: 139 NG/DL

## 2025-01-25 LAB
BACTERIA UR CULT: NORMAL
CYSTATIN C SERPL-MCNC: 1.14 MG/L
GFR/BSA.PRED SERPLBLD CYS-BASED-ARV: 61 ML/MIN/1.73M2
URINE CYTOLOGY: NORMAL

## 2025-02-04 ENCOUNTER — APPOINTMENT (OUTPATIENT)
Dept: UROLOGY | Facility: CLINIC | Age: 75
End: 2025-02-04

## 2025-02-04 DIAGNOSIS — R68.82 DECREASED LIBIDO: ICD-10-CM

## 2025-02-04 DIAGNOSIS — N52.9 MALE ERECTILE DYSFUNCTION, UNSPECIFIED: ICD-10-CM

## 2025-02-04 DIAGNOSIS — N20.0 CALCULUS OF KIDNEY: ICD-10-CM

## 2025-02-04 DIAGNOSIS — N40.1 BENIGN PROSTATIC HYPERPLASIA WITH LOWER URINARY TRACT SYMPMS: ICD-10-CM

## 2025-02-04 DIAGNOSIS — N13.8 BENIGN PROSTATIC HYPERPLASIA WITH LOWER URINARY TRACT SYMPMS: ICD-10-CM

## 2025-02-04 PROCEDURE — 99215 OFFICE O/P EST HI 40 MIN: CPT | Mod: 2W
